# Patient Record
Sex: FEMALE | Race: BLACK OR AFRICAN AMERICAN | NOT HISPANIC OR LATINO | URBAN - METROPOLITAN AREA
[De-identification: names, ages, dates, MRNs, and addresses within clinical notes are randomized per-mention and may not be internally consistent; named-entity substitution may affect disease eponyms.]

---

## 2019-05-11 ENCOUNTER — INPATIENT (INPATIENT)
Facility: HOSPITAL | Age: 74
LOS: 6 days | Discharge: NURSING, REHAB OR LONG-TERM CARE | End: 2019-05-18
Attending: INTERNAL MEDICINE | Admitting: INTERNAL MEDICINE
Payer: MEDICARE

## 2019-05-11 VITALS
DIASTOLIC BLOOD PRESSURE: 92 MMHG | HEIGHT: 65 IN | SYSTOLIC BLOOD PRESSURE: 148 MMHG | WEIGHT: 95.02 LBS | HEART RATE: 68 BPM | RESPIRATION RATE: 18 BRPM | OXYGEN SATURATION: 96 % | TEMPERATURE: 96 F

## 2019-05-11 DIAGNOSIS — Z82.0 FAMILY HISTORY OF EPILEPSY AND OTHER DISEASES OF THE NERVOUS SYSTEM: ICD-10-CM

## 2019-05-11 DIAGNOSIS — I63.9 CEREBRAL INFARCTION, UNSPECIFIED: ICD-10-CM

## 2019-05-11 LAB
ALBUMIN SERPL ELPH-MCNC: 4 G/DL — SIGNIFICANT CHANGE UP (ref 3.3–5)
ALP SERPL-CCNC: 70 U/L — SIGNIFICANT CHANGE UP (ref 40–120)
ALT FLD-CCNC: 19 U/L — SIGNIFICANT CHANGE UP (ref 12–78)
ANION GAP SERPL CALC-SCNC: 10 MMOL/L — SIGNIFICANT CHANGE UP (ref 5–17)
APPEARANCE UR: CLEAR — SIGNIFICANT CHANGE UP
APTT BLD: 31.2 SEC — SIGNIFICANT CHANGE UP (ref 27.5–36.3)
AST SERPL-CCNC: 16 U/L — SIGNIFICANT CHANGE UP (ref 15–37)
BACTERIA # UR AUTO: ABNORMAL
BILIRUB SERPL-MCNC: 0.7 MG/DL — SIGNIFICANT CHANGE UP (ref 0.2–1.2)
BILIRUB UR-MCNC: ABNORMAL
BUN SERPL-MCNC: 16 MG/DL — SIGNIFICANT CHANGE UP (ref 7–23)
CALCIUM SERPL-MCNC: 9.5 MG/DL — SIGNIFICANT CHANGE UP (ref 8.5–10.1)
CHLORIDE SERPL-SCNC: 106 MMOL/L — SIGNIFICANT CHANGE UP (ref 96–108)
CK MB BLD-MCNC: 1.2 % — SIGNIFICANT CHANGE UP (ref 0–3.5)
CK MB CFR SERPL CALC: 1.2 NG/ML — SIGNIFICANT CHANGE UP (ref 0.5–3.6)
CK SERPL-CCNC: 98 U/L — SIGNIFICANT CHANGE UP (ref 26–192)
CO2 SERPL-SCNC: 28 MMOL/L — SIGNIFICANT CHANGE UP (ref 22–31)
COLOR SPEC: YELLOW — SIGNIFICANT CHANGE UP
CREAT SERPL-MCNC: 1.12 MG/DL — SIGNIFICANT CHANGE UP (ref 0.5–1.3)
DIFF PNL FLD: NEGATIVE — SIGNIFICANT CHANGE UP
EPI CELLS # UR: SIGNIFICANT CHANGE UP
GLUCOSE BLDC GLUCOMTR-MCNC: 128 MG/DL — HIGH (ref 70–99)
GLUCOSE SERPL-MCNC: 132 MG/DL — HIGH (ref 70–99)
GLUCOSE UR QL: NEGATIVE MG/DL — SIGNIFICANT CHANGE UP
HCT VFR BLD CALC: 42.2 % — SIGNIFICANT CHANGE UP (ref 34.5–45)
HGB BLD-MCNC: 13.5 G/DL — SIGNIFICANT CHANGE UP (ref 11.5–15.5)
INR BLD: 1.1 RATIO — SIGNIFICANT CHANGE UP (ref 0.88–1.16)
KETONES UR-MCNC: ABNORMAL
LEUKOCYTE ESTERASE UR-ACNC: ABNORMAL
LIDOCAIN IGE QN: 42 U/L — LOW (ref 73–393)
MAGNESIUM SERPL-MCNC: 2.2 MG/DL — SIGNIFICANT CHANGE UP (ref 1.6–2.6)
MCHC RBC-ENTMCNC: 31.5 PG — SIGNIFICANT CHANGE UP (ref 27–34)
MCHC RBC-ENTMCNC: 32 GM/DL — SIGNIFICANT CHANGE UP (ref 32–36)
MCV RBC AUTO: 98.4 FL — SIGNIFICANT CHANGE UP (ref 80–100)
NITRITE UR-MCNC: NEGATIVE — SIGNIFICANT CHANGE UP
NRBC # BLD: 0 /100 WBCS — SIGNIFICANT CHANGE UP (ref 0–0)
PH UR: 5 — SIGNIFICANT CHANGE UP (ref 5–8)
PLATELET # BLD AUTO: 212 K/UL — SIGNIFICANT CHANGE UP (ref 150–400)
POTASSIUM SERPL-MCNC: 3.8 MMOL/L — SIGNIFICANT CHANGE UP (ref 3.5–5.3)
POTASSIUM SERPL-SCNC: 3.8 MMOL/L — SIGNIFICANT CHANGE UP (ref 3.5–5.3)
PROT SERPL-MCNC: 7.8 GM/DL — SIGNIFICANT CHANGE UP (ref 6–8.3)
PROT UR-MCNC: 15 MG/DL
PROTHROM AB SERPL-ACNC: 12.4 SEC — SIGNIFICANT CHANGE UP (ref 10–12.9)
RBC # BLD: 4.29 M/UL — SIGNIFICANT CHANGE UP (ref 3.8–5.2)
RBC # FLD: 12.9 % — SIGNIFICANT CHANGE UP (ref 10.3–14.5)
SODIUM SERPL-SCNC: 144 MMOL/L — SIGNIFICANT CHANGE UP (ref 135–145)
SP GR SPEC: 1.02 — SIGNIFICANT CHANGE UP (ref 1.01–1.02)
TROPONIN I SERPL-MCNC: <.015 NG/ML — SIGNIFICANT CHANGE UP (ref 0.01–0.04)
TSH SERPL-MCNC: 0.89 UIU/ML — SIGNIFICANT CHANGE UP (ref 0.36–3.74)
UROBILINOGEN FLD QL: 4 MG/DL
WBC # BLD: 9.54 K/UL — SIGNIFICANT CHANGE UP (ref 3.8–10.5)
WBC # FLD AUTO: 9.54 K/UL — SIGNIFICANT CHANGE UP (ref 3.8–10.5)
WBC UR QL: SIGNIFICANT CHANGE UP

## 2019-05-11 PROCEDURE — 71045 X-RAY EXAM CHEST 1 VIEW: CPT | Mod: 26

## 2019-05-11 PROCEDURE — 70450 CT HEAD/BRAIN W/O DYE: CPT | Mod: 26

## 2019-05-11 PROCEDURE — 99285 EMERGENCY DEPT VISIT HI MDM: CPT

## 2019-05-11 PROCEDURE — 93010 ELECTROCARDIOGRAM REPORT: CPT

## 2019-05-11 RX ORDER — ENOXAPARIN SODIUM 100 MG/ML
30 INJECTION SUBCUTANEOUS DAILY
Refills: 0 | Status: DISCONTINUED | OUTPATIENT
Start: 2019-05-11 | End: 2019-05-18

## 2019-05-11 RX ORDER — ENOXAPARIN SODIUM 100 MG/ML
40 INJECTION SUBCUTANEOUS DAILY
Refills: 0 | Status: DISCONTINUED | OUTPATIENT
Start: 2019-05-11 | End: 2019-05-11

## 2019-05-11 RX ORDER — ASPIRIN/CALCIUM CARB/MAGNESIUM 324 MG
81 TABLET ORAL DAILY
Refills: 0 | Status: DISCONTINUED | OUTPATIENT
Start: 2019-05-11 | End: 2019-05-18

## 2019-05-11 RX ORDER — ATORVASTATIN CALCIUM 80 MG/1
10 TABLET, FILM COATED ORAL AT BEDTIME
Refills: 0 | Status: DISCONTINUED | OUTPATIENT
Start: 2019-05-11 | End: 2019-05-18

## 2019-05-11 RX ADMIN — ATORVASTATIN CALCIUM 10 MILLIGRAM(S): 80 TABLET, FILM COATED ORAL at 22:49

## 2019-05-11 NOTE — ED PROVIDER NOTE - PHYSICAL EXAMINATION
Gen: Alert, Well appearing. NAD    Head: NC, AT, PERRL, EOMI, normal lids/conjunctiva   ENT:  patent oropharynx without erythema/exudate, uvula midline  Neck: supple, no tenderness/meningismus  Pulm: Bilateral clear BS, normal resp effort, no wheeze/stridor/retractions  CV: RRR, no M/R/G, +dist pulses   Abd: soft, NT/ND, +BS, no guarding/rebound tenderness  Mskel: no edema/erythema/cyanosis   Skin: no rash   Neuro: AAOx1, no sensory/motor deficits, CN 2-12 intact

## 2019-05-11 NOTE — H&P ADULT - NSHPPHYSICALEXAM_GEN_ALL_CORE
PHYSICAL EXAM:    GENERAL: NAD, well-groomed, well-developed  HEAD:  Atraumatic, Normocephalic  EYES: EOMI, PERRLA, conjunctiva and sclera clear  ENMT: No tonsillar erythema, exudates, or enlargement; Moist mucous membranes, , No lesions  NECK: Supple, No JVD, Normal thyroid  NERVOUS SYSTEM:  Alert  confused ; Motor moves  mild  rt  LE  wekness  CHEST/LUNG: Clear  bilaterally; No rales, rhonchi, wheezing, or rubs  HEART: Regular rate and rhythm; No murmurs, rubs, or gallops  ABDOMEN: Soft, Nontender, Nondistended; no  masses Bowel sounds present  EXTREMITIES:  + Peripheral Pulses, No clubbing, cyanosis, or edema  LYMPH: No lymphadenopathy noted   RECTAL: deferred    SKIN: No rashes or lesions

## 2019-05-11 NOTE — H&P ADULT - HISTORY OF PRESENT ILLNESS
patient  reported  with  weakness  this  am  evalauted  in  ER  found  to  have  cva  admitted  for  further  w/u  and treatment

## 2019-05-11 NOTE — ED ADULT NURSE NOTE - OBJECTIVE STATEMENT
Patient c/o altered mental status. As per son, patient woke up this morning with left sided weakness, left side of face drooping and noticeable change in mental status. Patient has dementia.

## 2019-05-11 NOTE — ED ADULT NURSE NOTE - NSIMPLEMENTINTERV_GEN_ALL_ED
Implemented All Fall with Harm Risk Interventions:  [place] to call system. Call bell, personal items and telephone within reach. Instruct patient to call for assistance. Room bathroom lighting operational. Non-slip footwear when patient is off stretcher. Physically safe environment: no spills, clutter or unnecessary equipment. Stretcher in lowest position, wheels locked, appropriate side rails in place. Provide visual cue, wrist band, yellow gown, etc. Monitor gait and stability. Monitor for mental status changes and reorient to person, place, and time. Review medications for side effects contributing to fall risk. Reinforce activity limits and safety measures with patient and family. Provide visual clues: red socks.

## 2019-05-11 NOTE — ED ADULT TRIAGE NOTE - CHIEF COMPLAINT QUOTE
as per son mom has weakness and facial drooping on left side, weakness is generalized with change in normal mental status, last seen normal was 10pm last night.  as per ems

## 2019-05-11 NOTE — H&P ADULT - NSHPLABSRESULTS_GEN_ALL_CORE
LABS:                        13.5   9.54  )-----------( 212      ( 11 May 2019 14:46 )             42.2         144  |  106  |  16  ----------------------------<  132<H>  3.8   |  28  |  1.12    Ca    9.5      11 May 2019 14:46  Mg     2.2         TPro  7.8  /  Alb  4.0  /  TBili  0.7  /  DBili  x   /  AST  16  /  ALT  19  /  AlkPhos  70      PT/INR - ( 11 May 2019 14:46 )   PT: 12.4 sec;   INR: 1.10 ratio         PTT - ( 11 May 2019 14:46 )  PTT:31.2 sec  Urinalysis Basic - ( 11 May 2019 18:04 )    Color: Yellow / Appearance: Clear / S.025 / pH: x  Gluc: x / Ketone: Small  / Bili: Small / Urobili: 4 mg/dL   Blood: x / Protein: 15 mg/dL / Nitrite: Negative   Leuk Esterase: Trace / RBC: x / WBC 0-2   Sq Epi: x / Non Sq Epi: Occasional / Bacteria: Few      < from: CT Head No Cont (19 @ 16:14) >    EXAM:  CT BRAIN                            PROCEDURE DATE:  2019          INTERPRETATION:  Left facial droop, weakness.    Noncontrast head CT.  Moderate cerebral parenchymal volume loss with commensurate proportionate   ventricular sulcal prominence. Moderate chronic ischemic gliotic   bihemispheric cerebral white matter changes. No acute territorial   infarct, intra-axial or extra-axial hemorrhage edema or mass effect.   Mastoids and paranasal sinuses are clear. Cranium intact. Bilateral   hyperostosis frontalis.    Impression: No acute findings. Involutional, chronic microangiopathic   changes.    If neurologic findings persist or progress consider MR for more sensitive   evaluation, if there are no contraindications.      < end of copied text >

## 2019-05-11 NOTE — ED PROVIDER NOTE - OBJECTIVE STATEMENT
74yo female with pmh dementia (AOx2, ambulates mostly independently) presents with  left facial droop, complete aphasia and gen weakness noted upon waking up at 12pm but then improved upon arrival to ER at 1pm, now pt at baseline).     ROS: No fever/chills. No photophobia/eye pain/changes in vision, No ear pain/sore throat/dysphagia, No chest pain/palpitations. No SOB/cough/stridor. No abdominal pain, N/V/D, no black/bloody bm. No dysuria/frequency/discharge, No headache. No Dizziness.  No rash.  No numbness/tingling/weakness.

## 2019-05-12 LAB
ALBUMIN SERPL ELPH-MCNC: 3.6 G/DL — SIGNIFICANT CHANGE UP (ref 3.3–5)
ALP SERPL-CCNC: 65 U/L — SIGNIFICANT CHANGE UP (ref 40–120)
ALT FLD-CCNC: 19 U/L — SIGNIFICANT CHANGE UP (ref 12–78)
ANION GAP SERPL CALC-SCNC: 12 MMOL/L — SIGNIFICANT CHANGE UP (ref 5–17)
AST SERPL-CCNC: 27 U/L — SIGNIFICANT CHANGE UP (ref 15–37)
BILIRUB SERPL-MCNC: 0.8 MG/DL — SIGNIFICANT CHANGE UP (ref 0.2–1.2)
BUN SERPL-MCNC: 18 MG/DL — SIGNIFICANT CHANGE UP (ref 7–23)
CALCIUM SERPL-MCNC: 9.2 MG/DL — SIGNIFICANT CHANGE UP (ref 8.5–10.1)
CHLORIDE SERPL-SCNC: 103 MMOL/L — SIGNIFICANT CHANGE UP (ref 96–108)
CHOLEST SERPL-MCNC: 338 MG/DL — HIGH (ref 10–199)
CO2 SERPL-SCNC: 26 MMOL/L — SIGNIFICANT CHANGE UP (ref 22–31)
CREAT SERPL-MCNC: 0.93 MG/DL — SIGNIFICANT CHANGE UP (ref 0.5–1.3)
GLUCOSE SERPL-MCNC: 88 MG/DL — SIGNIFICANT CHANGE UP (ref 70–99)
HBA1C BLD-MCNC: 5 % — SIGNIFICANT CHANGE UP (ref 4–5.6)
HCT VFR BLD CALC: 40.5 % — SIGNIFICANT CHANGE UP (ref 34.5–45)
HDLC SERPL-MCNC: 50 MG/DL — SIGNIFICANT CHANGE UP
HGB BLD-MCNC: 13 G/DL — SIGNIFICANT CHANGE UP (ref 11.5–15.5)
LIPID PNL WITH DIRECT LDL SERPL: 274 MG/DL — HIGH
MCHC RBC-ENTMCNC: 31.4 PG — SIGNIFICANT CHANGE UP (ref 27–34)
MCHC RBC-ENTMCNC: 32.1 GM/DL — SIGNIFICANT CHANGE UP (ref 32–36)
MCV RBC AUTO: 97.8 FL — SIGNIFICANT CHANGE UP (ref 80–100)
NRBC # BLD: 0 /100 WBCS — SIGNIFICANT CHANGE UP (ref 0–0)
PLATELET # BLD AUTO: 205 K/UL — SIGNIFICANT CHANGE UP (ref 150–400)
POTASSIUM SERPL-MCNC: 3.9 MMOL/L — SIGNIFICANT CHANGE UP (ref 3.5–5.3)
POTASSIUM SERPL-SCNC: 3.9 MMOL/L — SIGNIFICANT CHANGE UP (ref 3.5–5.3)
PROT SERPL-MCNC: 7.3 GM/DL — SIGNIFICANT CHANGE UP (ref 6–8.3)
RBC # BLD: 4.14 M/UL — SIGNIFICANT CHANGE UP (ref 3.8–5.2)
RBC # FLD: 12.8 % — SIGNIFICANT CHANGE UP (ref 10.3–14.5)
SODIUM SERPL-SCNC: 141 MMOL/L — SIGNIFICANT CHANGE UP (ref 135–145)
T3 SERPL-MCNC: 59 NG/DL — LOW (ref 80–200)
T4 AB SER-ACNC: 7.3 UG/DL — SIGNIFICANT CHANGE UP (ref 4.6–12)
TOTAL CHOLESTEROL/HDL RATIO MEASUREMENT: 6.8 RATIO — SIGNIFICANT CHANGE UP (ref 3.3–7.1)
TRIGL SERPL-MCNC: 71 MG/DL — SIGNIFICANT CHANGE UP (ref 10–149)
WBC # BLD: 7.28 K/UL — SIGNIFICANT CHANGE UP (ref 3.8–10.5)
WBC # FLD AUTO: 7.28 K/UL — SIGNIFICANT CHANGE UP (ref 3.8–10.5)

## 2019-05-12 RX ADMIN — ATORVASTATIN CALCIUM 10 MILLIGRAM(S): 80 TABLET, FILM COATED ORAL at 21:52

## 2019-05-12 RX ADMIN — Medication 81 MILLIGRAM(S): at 12:08

## 2019-05-12 RX ADMIN — ENOXAPARIN SODIUM 30 MILLIGRAM(S): 100 INJECTION SUBCUTANEOUS at 12:08

## 2019-05-12 NOTE — CONSULT NOTE ADULT - SUBJECTIVE AND OBJECTIVE BOX
Subjective Complaints:  Historian:       Consult requested by ER doctor:                  Attending: DR Kirkpatrick     HPI:  Patient is a 73 y o woman admitted for left sided weakness involving the face ,arm and leg ,condition has improved ,patient has an h/o dementia and difficulty to follow commands .    LETICIA MORAN    PAST MEDICAL & SURGICAL HISTORY:  FH: senile dementia  73yFemale    MEDICATIONS  (STANDING):  aspirin enteric coated 81 milliGRAM(s) Oral daily  atorvastatin 10 milliGRAM(s) Oral at bedtime  enoxaparin Injectable 30 milliGRAM(s) SubCutaneous daily    MEDICATIONS  (PRN):      Allergies    No Known Allergies    Intolerances      FAMILY HISTORY:      REVIEW OF SYSTEMS:  General:  No wt loss, fevers, chills, night sweats  Eyes:  Good vision, no reported pain  ENT:  No sore throat, pain, runny nose, dysphagia  CV:  No pain, palpitatioins, hypo/hypertension  Resp:  No dyspnea, cough, tachypnea, wheezing  GI:  No pain, nausea, vomiting, diarrhea, constipatiion  :  No pain, bleeding, incontinence, nocturia  Muscle:  No pain, weakness  Breast:  No pain, abscess, mass, discharge  Neuro: lethargic ,unable to cooperate with exam   Psych:  No fatigue, insomnia, mood problems, depression  Endocrine:  No polyuria, polydypsia, cold/heat intolerance  Heme:  No petechiae, ecchymosis, easy bruisability  Skin:  No rash, tattoos, scars, edema      Vital Signs Last 24 Hrs  T(C): 36.9 (12 May 2019 11:04), Max: 36.9 (11 May 2019 17:20)  T(F): 98.4 (12 May 2019 11:04), Max: 98.5 (11 May 2019 17:20)  HR: 49 (12 May 2019 12:49) (49 - 76)  BP: 110/57 (12 May 2019 12:49) (110/57 - 177/75)  BP(mean): --  RR: 18 (12 May 2019 12:49) (16 - 18)  SpO2: 96% (12 May 2019 12:49) (90% - 100%)    GENERAL PHYSICAL EXAM:  General:  Appears stated age, well-groomed, well-nourished, no distress  HEENT:  NC/AT, patent nares w/ pink mucosa, OP clear w/o lesions, PERRL, EOMI, conjunctivae clear, no thyromegaly, nodules, adenopathy, no JVD  Chest:  Full & symmetric excursion, no increased effort, breath sounds clear  Cardiovascular:  Regular rhythm, S1, S2, no murmur/rub/S3/S4, no carotid/femoral/abdominal bruit, radial/pedal pulses 2+, no edema  Abdomen:  Soft, non-tender, non-distended, normoactive bowel sounds, no HSM  Extremities:  Gait & station:   Digits:   Nails:   Joints, Bones, Muscles:   ROM:   Stability:  Skin:  No rash/erythema/ecchymoses/petechiae/wounds/abscess/warm/dry  Musculoskeletal:  Full ROM in all joints w/o swelling/tenderness/effusion    NEUROLOGICAL EXAM:  HENT:  Normocephalic head; atraumatic head.  Neck supple.  ENT: normal looking.  Mental State:   sleepy but arousable ,unable to follow commands   Cranial Nerves:  II-XII:   Pupils round and reactive to light and accommodation.  Extraocular movements full.  Visual fields full (no homonymous hemianopsia).  Visual acuity wnl.  Facial symmetry intact.  Tongue midline.  Motor Functions: 3/5 throughout   Sensory Functions:   Intact to touch and pinprick to face and extremities.    Reflexes:  Deep tendon reflexes normoactive to biceps, knees and ankles.  Babinski absent (present).  Cerebellar Testing:    Finger to nose intact.  Nystagmus absent.  Gait: unable to stand      LABS:                        13.0   7.28  )-----------( 205      ( 12 May 2019 07:54 )             40.5     05-12    141  |  103  |  18  ----------------------------<  88  3.9   |  26  |  0.93    Ca    9.2      12 May 2019 07:54  Mg     2.2     05-    TPro  7.3  /  Alb  3.6  /  TBili  0.8  /  DBili  x   /  AST  27  /  ALT  19  /  AlkPhos  65  05-12    PT/INR - ( 11 May 2019 14:46 )   PT: 12.4 sec;   INR: 1.10 ratio         PTT - ( 11 May 2019 14:46 )  PTT:31.2 sec    Urinalysis Basic - ( 11 May 2019 18:04 )    Color: Yellow / Appearance: Clear / S.025 / pH: x  Gluc: x / Ketone: Small  / Bili: Small / Urobili: 4 mg/dL   Blood: x / Protein: 15 mg/dL / Nitrite: Negative   Leuk Esterase: Trace / RBC: x / WBC 0-2   Sq Epi: x / Non Sq Epi: Occasional / Bacteria: Few        RADIOLOGY & ADDITIONAL STUDIES:    12 Lead ECG:   Provider's Contact #: [phone] ( @ 13:56)  Comprehensive Metabolic Panel: STAT ( @ 14:03)  Complete Blood Count: STAT ( @ 14:03)  Troponin I, Serum: STAT ( @ 14:03)  Lipase, Serum: STAT ( 14:03)  Magnesium, Serum: STAT ( 14:03)  Prothrombin Time and INR, Plasma:  Start Date:11-May-2019. STAT ( @ 14:03)  Activated Partial Thromboplastin Time:  Start Date:11-May-2019. STAT ( @ 14:03)  Urinalysis: STAT ( @ 14:03)  12 Lead ECG ( @ 14:03)  Xray Chest 1 View AP/PA.: Urgent   Indication: Chest Pain  Transport: Walking  Exam Completed ( @ 14:03)  Cardiac Monitor Bedside:     Time/Priority:  STAT ( @ 14:03)  IV Insert:     Time/Priority:  STAT ( @ 14:03)  Creatine Kinase, Serum: STAT ( @ 14:03)  CKMB Mass Assay: STAT ( @ 14:03)  CT Head No Cont: Urgent   Indication: left facial droop, weakness now resolved  Transport: Stretcher-Crib  Exam Completed  Provider's Contact #: [phone] ( @ 14:03)  POCT  Blood Glucose: 14:04 ( @ 14:05)  Admit to Inpatient Level of Care:     Service:  TELEMETRY    Physician:  Yossi Kirkpatrick    Additional Instructions:  Diagnosis: TIA (transient ischemic attack)  Isolation: None  Special Consideration: None ( @ 16:47)  Admit from ED ( @ 17:00)  Urine Microscopic-Add On (NC): 17:50 ( @ 18:08)  aspirin enteric coated: [Ordered as Ecotrin]  81 milliGRAM(s), Oral, daily  Administration Instructions: Swallow whole * don't crush/chew  Provider's Contact #: [phone] ( 19:32)  atorvastatin: [Ordered as LIPITOR]  10 milliGRAM(s), Oral, at bedtime  Provider's Contact #: [phone] ( 19:32)  enoxaparin Injectable: [Ordered as LOVENOX]  40 milliGRAM(s), SubCutaneous, daily  Provider's Contact #: [phone] ( 19:32)  TTE Echo Doppler w/o Cont:   Transport: Stretcher-Crib  Monitor: w/o Monitor  Provider's Contact #: [phone] (:32)  Triiodothyronine, Total (T3 Total): Routine (:32)  T4, Serum: Routine (:32)  Thyroid Stimulating Hormone, Serum: Routine  Cancel Reason: -Lab Reordered (:32)  Diet, Regular (:32)  US Duplex Carotid Arteries Complete, Bilateral: Routine   Indication: change  in mental  status  Transport: Stretcher-Crib  Provider's Contact #: [phone] (:32)  Comprehensive Metabolic Panel: AM Sched. Collection: 12-May-2019 05:00 ( @ 19:32)  Complete Blood Count: AM Sched. Collection: 12-May-2019 05:00 ( @ 19:32)  Culture - Urine: Routine  Specimen Source: Clean Catch (Midstream)  Addl Info: If indwelling urinary catheter > 14 days, obtain an order to remove and replace prior to c ( @ 19:32)  Consult- PT Evaluate and Treat:   *Reason for Consult - Must select at least one choice*     Other: cva  Weight Bearing Restrictions: No ( @ 19:38)  enoxaparin Injectable: [Ordered as LOVENOX]  30 milliGRAM(s), SubCutaneous, daily  Provider's Contact #: [phone] ( @ 19:43)  Provider to RN:       pt  evaluated  ready  for  admission ( @ 21:03)  Thyroid Stimulating Hormone, Serum: 14:40 ( @ 21:15)  Consult- Chaplaincy Care:    Reason for Consult: Hospital  Requested ( @ 22:34)  Remote Telemetry/EKG EXCEPT Off Unit Tests:     Time/Priority:  Routine ( @ 22:46)  Lipid Profile: AM Sched. Collection: 12-May-2019 05:00 ( @ 00:22)  Hemoglobin A1C, Whole Blood: AM Sched. Collection: 12-May-2019 05:00 ( @ 00:22)      Assessment & Opinion:73 y o woman with h/o dementia seen for evaluation because of possible cva   DX TIA ? CVA?     Recommendations:  Brain MRI.  Carotid doppler.  Echocardiogram.    DVT prophylaxis as ordered.  Medications:  ASA ,STATIN

## 2019-05-12 NOTE — PROGRESS NOTE ADULT - SUBJECTIVE AND OBJECTIVE BOX
INTERVAL HPI/OVERNIGHT EVENTS:        REVIEW OF SYSTEMS:  CONSTITUTIONAL:  no  complaints      MEDICATION:  aspirin enteric coated 81 milliGRAM(s) Oral daily  atorvastatin 10 milliGRAM(s) Oral at bedtime  enoxaparin Injectable 30 milliGRAM(s) SubCutaneous daily    Vital Signs Last 24 Hrs  T(C): 36.6 (12 May 2019 05:13), Max: 36.9 (11 May 2019 17:20)  T(F): 97.8 (12 May 2019 05:13), Max: 98.5 (11 May 2019 17:20)  HR: 59 (12 May 2019 05:13) (59 - 76)  BP: 158/72 (12 May 2019 05:13) (148/92 - 177/75)  BP(mean): --  RR: 17 (12 May 2019 05:13) (16 - 18)  SpO2: 91% (12 May 2019 05:13) (90% - 100%)    PHYSICAL EXAM:  GENERAL: NAD, well-groomed, well-developed  EYES:  conjunctiva and sclera clear  ENMT:  Moist mucous membranes,   NECK: Supple, No JVD, Normal thyroid  NERVOUS SYSTEM:  Alert oriented x1  moves all  extr   CHEST/LUNG: Clear    HEART: Regular rate and rhythm; No murmurs, rubs, or gallops  ABDOMEN: Soft, Nontender, Nondistended; Bowel sounds present  EXTREMITIES:  no  edema no  tenderness  SKIN: No rashes   LABS:                        13.5   9.54  )-----------( 212      ( 11 May 2019 14:46 )             42.2     05-11    144  |  106  |  16  ----------------------------<  132<H>  3.8   |  28  |  1.12    Ca    9.5      11 May 2019 14:46  Mg     2.2     05-11    TPro  7.8  /  Alb  4.0  /  TBili  0.7  /  DBili  x   /  AST  16  /  ALT  19  /  AlkPhos  70  05-11    PT/INR - ( 11 May 2019 14:46 )   PT: 12.4 sec;   INR: 1.10 ratio         PTT - ( 11 May 2019 14:46 )  PTT:31.2 sec  Urinalysis Basic - ( 11 May 2019 18:04 )    Color: Yellow / Appearance: Clear / S.025 / pH: x  Gluc: x / Ketone: Small  / Bili: Small / Urobili: 4 mg/dL   Blood: x / Protein: 15 mg/dL / Nitrite: Negative   Leuk Esterase: Trace / RBC: x / WBC 0-2   Sq Epi: x / Non Sq Epi: Occasional / Bacteria: Few      CAPILLARY BLOOD GLUCOSE      POCT Blood Glucose.: 128 mg/dL (11 May 2019 14:04)      RADIOLOGY & ADDITIONAL TESTS:    Imaging reports  Personally Reviewed:  [x ] YES  [ ] NO    Consultant(s) Notes Reviewed:  [ ] YES  [ ] NO    Care Discussed with Consultants/Other Providers [ x] YES  [ ] NO  Problem/Plan - 1:  ·  Problem: CVA (cerebral vascular accident).  Plan: asa  statin  tte  cartid  doppler  neuro  eval patient  eval.     Problem/Plan - 2:  ·  Problem: FH: senile dementia.  Plan: observation

## 2019-05-13 PROCEDURE — 70551 MRI BRAIN STEM W/O DYE: CPT | Mod: 26

## 2019-05-13 PROCEDURE — 93880 EXTRACRANIAL BILAT STUDY: CPT | Mod: 26

## 2019-05-13 RX ORDER — HYDRALAZINE HCL 50 MG
5 TABLET ORAL ONCE
Refills: 0 | Status: COMPLETED | OUTPATIENT
Start: 2019-05-13 | End: 2019-05-13

## 2019-05-13 RX ADMIN — ENOXAPARIN SODIUM 30 MILLIGRAM(S): 100 INJECTION SUBCUTANEOUS at 11:18

## 2019-05-13 RX ADMIN — Medication 5 MILLIGRAM(S): at 00:43

## 2019-05-13 RX ADMIN — ATORVASTATIN CALCIUM 10 MILLIGRAM(S): 80 TABLET, FILM COATED ORAL at 21:56

## 2019-05-13 RX ADMIN — Medication 81 MILLIGRAM(S): at 11:18

## 2019-05-13 NOTE — DIETITIAN INITIAL EVALUATION ADULT. - ENERGY NEEDS
Height (cm): 165.1 (05-11)  Weight (kg): 38 (05-11)  BMI (kg/m2): 13.9 (05-11)  IBW:  56.8 kg +/- 10%  % IBW:  67%    UBW: unknown     %UBW: unknown

## 2019-05-13 NOTE — DIETITIAN INITIAL EVALUATION ADULT. - PERTINENT LABORATORY DATA
05-12 Na141 mmol/L Glu 88 mg/dL K+ 3.9 mmol/L Cr  0.93 mg/dL BUN 18 mg/dL 05-12 Alb 3.6 g/dL 05-12 [id number] 5.0 % 05-12 Chol 338 mg/dL<H>  mg/dL<H> HDL 50 mg/dL Trig 71 mg/dL

## 2019-05-13 NOTE — PHYSICAL THERAPY INITIAL EVALUATION ADULT - STRENGTHENING, PT EVAL
Pt will increase general upper and lower extremity strength to 5/5 to improve functional strength  by 4 weeks
Pt will increase muscle strength to 4+/5 to improve transfers and ambulation within 3-4 weeks.

## 2019-05-13 NOTE — PHYSICAL THERAPY INITIAL EVALUATION ADULT - MANUAL MUSCLE TESTING RESULTS, REHAB EVAL
BUE/BLE grossly graded 3-/5./grossly assessed due to
MMT 3+/5 for bilateral upper and lower extremities

## 2019-05-13 NOTE — DIETITIAN INITIAL EVALUATION ADULT. - PHYSICAL APPEARANCE
underweight/emaciated/debilitated/contracted/BMI = 14; no edema noted; Nutrition focused physical exam conducted:  Subcutaneous fat loss: [moderate ] Orbital fat pads region, [mild ]Buccal fat region, [severe ]Triceps region,  [severe ]Ribs region.  Muscle wasting: [severe ]Temples region, [severe ]Clavicle region, [severe ]Shoulder region, [severe ]Scapula region, [severe ]Interosseous region,  [severe ]thigh region, [severe ]Calf region

## 2019-05-13 NOTE — PHYSICAL THERAPY INITIAL EVALUATION ADULT - BALANCE DISTURBANCE, IDENTIFIED IMPAIRMENT CONTRIBUTE, REHAB EVAL
impaired motor control/impaired postural control/decreased strength
impaired postural control/decreased strength

## 2019-05-13 NOTE — DIETITIAN INITIAL EVALUATION ADULT. - OTHER INFO
Pt seen for low BMI. Unable to interview pt due to cognitive impairment. No reports of N/V/C/D or chew/swallowing issues but will change to soft consistency for ease of eating.

## 2019-05-13 NOTE — PHYSICAL THERAPY INITIAL EVALUATION ADULT - PERTINENT HX OF CURRENT PROBLEM, REHAB EVAL
TIA
Patient is a 72 y/o female admitted to [facility] due to TIA. CT of head negative acute findings.

## 2019-05-13 NOTE — PHYSICAL THERAPY INITIAL EVALUATION ADULT - IMPAIRMENTS CONTRIBUTING TO GAIT DEVIATIONS, PT EVAL
impaired balance/decreased strength
impaired balance/cognition/impaired postural control/decreased strength/decreased flexibility

## 2019-05-13 NOTE — PHYSICAL THERAPY INITIAL EVALUATION ADULT - BALANCE TRAINING, PT EVAL
Pt will increase static/dynamic standing balance to WFL to perform all functional mobility without LOB, by 2 weeks
Pt will improve standing balance(S/D) to G/G- to increase dynamic activities within 3-4 weeks.

## 2019-05-13 NOTE — PHYSICAL THERAPY INITIAL EVALUATION ADULT - ACTIVE RANGE OF MOTION EXAMINATION, REHAB EVAL
AAROM on BUE/BLE./bilateral lower extremity Active ROM was WNL (within normal limits)/deficits as listed below/ravin. upper extremity Active ROM was WNL (within normal limits)
no Active ROM deficits were identified

## 2019-05-13 NOTE — DISCHARGE NOTE NURSING/CASE MANAGEMENT/SOCIAL WORK - NSDCDPATPORTLINK_GEN_ALL_CORE
You can access the [facility] Patient Portal, offered by [facility], by registering with the following website: [email or web address]

## 2019-05-13 NOTE — PROGRESS NOTE ADULT - SUBJECTIVE AND OBJECTIVE BOX
INTERVAL HPI/OVERNIGHT EVENTS:        REVIEW OF SYSTEMS:  CONSTITUTIONAL:  no  complaints    NECK: No pain or stiffnes  RESPIRATORY: No SOB   CARDIOVASCULAR: No chest pain, palpitations, dizziness,   GASTROINTESTINAL: No abdominal pain. No nausea, vomiting,   NEUROLOGICAL: No headaches, no  blurry  vision no  dizziness  SKIN: No itching,   MUSCULOSKELETAL: No pain    MEDICATION:  aspirin enteric coated 81 milliGRAM(s) Oral daily  atorvastatin 10 milliGRAM(s) Oral at bedtime  enoxaparin Injectable 30 milliGRAM(s) SubCutaneous daily    Vital Signs Last 24 Hrs  T(C): 36 (13 May 2019 05:09), Max: 36.9 (12 May 2019 11:04)  T(F): 96.8 (13 May 2019 05:09), Max: 98.4 (12 May 2019 11:04)  HR: 73 (13 May 2019 05:09) (49 - 75)  BP: 148/79 (13 May 2019 05:09) (110/57 - 181/99)  BP(mean): --  RR: 17 (13 May 2019 05:09) (17 - 18)  SpO2: 90% (13 May 2019 05:09) (90% - 96%)    PHYSICAL EXAM:  GENERAL: NAD, well-groomed, well-developed  EYES:  conjunctiva and sclera clear  ENMT:  Moist mucous membranes,   NECK: Supple, No JVD, Normal thyroid  NERVOUS SYSTEM:  Alert oriented  x1   moves  all  extr  CHEST/LUNG: Clear    HEART: Regular rate and rhythm; No murmurs, rubs, or gallops  ABDOMEN: Soft, Nontender, Nondistended; Bowel sounds present  EXTREMITIES:  no  edema no  tenderness  SKIN: No rashes   LABS:                        13.0   7.28  )-----------( 205      ( 12 May 2019 07:54 )             40.5     05-12    141  |  103  |  18  ----------------------------<  88  3.9   |  26  |  0.93    Ca    9.2      12 May 2019 07:54  Mg     2.2         TPro  7.3  /  Alb  3.6  /  TBili  0.8  /  DBili  x   /  AST  27  /  ALT  19  /  AlkPhos  65  05-12    PT/INR - ( 11 May 2019 14:46 )   PT: 12.4 sec;   INR: 1.10 ratio         PTT - ( 11 May 2019 14:46 )  PTT:31.2 sec  Urinalysis Basic - ( 11 May 2019 18:04 )    Color: Yellow / Appearance: Clear / S.025 / pH: x  Gluc: x / Ketone: Small  / Bili: Small / Urobili: 4 mg/dL   Blood: x / Protein: 15 mg/dL / Nitrite: Negative   Leuk Esterase: Trace / RBC: x / WBC 0-2   Sq Epi: x / Non Sq Epi: Occasional / Bacteria: Few      CAPILLARY BLOOD GLUCOSE          RADIOLOGY & ADDITIONAL TESTS:    Imaging reports  Personally Reviewed:  [ x] YES  [ ] NO    Consultant(s) Notes Reviewed:  [x] YES  [ ] NO    Care Discussed with Consultants/Other Providers [ x] YES  [ ] NO  Problem/Plan - 1:  ·  Problem: CVA (cerebral vascular accident).  Plan: asa  statin  tte  carotid  doppler  neuro  eval patient  eval. appreciated    Problem/Plan - 2:  ·  Problem: FH: senile dementia.  Plan: observation

## 2019-05-13 NOTE — PHYSICAL THERAPY INITIAL EVALUATION ADULT - PLANNED THERAPY INTERVENTIONS, PT EVAL
Pt will independently negotiate [XX] steps with R railing up/L railing down to enter and exit home, by 1week/balance training/gait training/strengthening
bed mobility training/gait training/strengthening/balance training/transfer training

## 2019-05-13 NOTE — PHYSICAL THERAPY INITIAL EVALUATION ADULT - RANGE OF MOTION EXAMINATION, REHAB EVAL
deficits as listed below/bilateral lower extremity was ROM was WNL (within normal limits)/bilateral upper extremity ROM was WNL (within normal limits)/AAROM on BUE/BLE.
no ROM deficits were identified

## 2019-05-13 NOTE — PHYSICAL THERAPY INITIAL EVALUATION ADULT - GAIT DEVIATIONS NOTED, PT EVAL
increased time in double stance/decreased lina/decreased velocity of limb motion/decreased step length/decreased stride length
decreased lina/increased stride width/increased time in double stance/decreased step length/decreased stride length

## 2019-05-13 NOTE — PHYSICAL THERAPY INITIAL EVALUATION ADULT - TRANSFER SAFETY CONCERNS NOTED: SIT/STAND, REHAB EVAL
decreased step length/decreased safety awareness/decreased sequencing ability/decreased weight-shifting ability/decreased balance during turns

## 2019-05-13 NOTE — DISCHARGE NOTE NURSING/CASE MANAGEMENT/SOCIAL WORK - NSDCPEPTSTRK_GEN_ALL_CORE
Risk factors for stroke/Prescribed medications/Stroke education booklet/Stroke support groups for patients, families, and friends/Call 911 for stroke/Need for follow up after discharge/Stroke warning signs and symptoms/Signs and symptoms of stroke

## 2019-05-13 NOTE — CHART NOTE - NSCHARTNOTEFT_GEN_A_CORE
Upon Nutritional Assessment by the Registered Dietitian your patient was determined to meet criteria / has evidence of the following diagnosis/diagnoses:          [ ]  Mild Protein Calorie Malnutrition        [ ]  Moderate Protein Calorie Malnutrition        [X ] Severe Protein Calorie Malnutrition        [ ] Unspecified Protein Calorie Malnutrition        [X ] Underweight / BMI <19        [ ] Morbid Obesity / BMI > 40      Findings as based on:  •  Comprehensive nutrition assessment and consultation  •  Calorie counts (nutrient intake analysis)  •  Food acceptance and intake status from observations by staff  •  Follow up  •  Patient education  •  Intervention secondary to interdisciplinary rounds  •   concerns      Treatment:    The following diet has been recommended:  Soft diet for ease of eating + Ensure Enlive x 2/day (provides 700 kcal, 40 g protein) for additional nutrition support      PROVIDER Section:     By signing this assessment you are acknowledging and agree with the diagnosis/diagnoses assigned by the Registered Dietitian    Comments:

## 2019-05-13 NOTE — CONSULT NOTE ADULT - SUBJECTIVE AND OBJECTIVE BOX
Patient is a 73y old  Female who presents with a chief complaint of cva (13 May 2019 08:50)      HPI: patient  reported  with  weakness  this  am  evalauted  in  ER  found  to  have  cva  admitted  for  further  w/u  and treatment (11 May 2019 19:25)    Currently      REVIEW OF SYSTEMS as above  Constitutional - No fever, No weight loss, No fatigue  HEENT - No neck pain  Respiratory - No cough, No shortness of breath  Cardiovascular - No chest pain, No palpitations  Gastrointestinal - No abdominal pain, No nausea, No vomiting  Genitourinary - No dysuria, No frequency  Neurological - No headaches, No loss of strength, No numbness, No tremors  Skin - No lesions   Endocrine - No temperature intolerance  Musculoskeletal - No joint pain  Psychiatric - No depression, No anxiety    PAST MEDICAL & SURGICAL HISTORY  FH: senile dementia      SOCHX:  lives with her son and daughter-in-law in a pvt house - 5 steps to enter. Sherequired assist for all ADL's. Ambulation ind  No tobacco, no alcohol    FMHX: FA/MO  Noncontributory       ALLERGIES  No Known Allergies      MEDICATIONS reviewed  MEDICATIONS  (STANDING):  aspirin enteric coated 81 milliGRAM(s) Oral daily  atorvastatin 10 milliGRAM(s) Oral at bedtime  enoxaparin Injectable 30 milliGRAM(s) SubCutaneous daily    MEDICATIONS  (PRN):      VITALS  T(C): 36.8 (19 @ 11:21), Max: 36.8 (19 @ 00:07)  HR: 95 (19 @ 11:21) (71 - 95)  BP: 150/76 (19 @ 11:21) (131/71 - 181/99)  RR: 18 (19 @ 11:21) (17 - 18)  SpO2: 90% (19 @ 10:00) (90% - 91%)  Wt(kg): -- 38 kg    PHYSICAL EXAM  BMI - 13.9  Constitutional - NAD, Comfortable  HEENT - NCAT, EOMI  Neck - Supple, functional ROM  Cardiovascular - RRR  Abdomen - Soft, Not tender  Extremities - Functional range of motion   Neurologic -                    Cognitive - Awake, Alert, Oriented     Speech Intact     Language  Intact     Motor - No focal deficits     Sensory - Intact to LT     Reflexes - DTR Intact     Psychiatric - Mood       RECENT LABS reviewed  CBC Full  -  ( 12 May 2019 07:54 )  WBC Count : 7.28 K/uL  RBC Count : 4.14 M/uL  Hemoglobin : 13.0 g/dL  Hematocrit : 40.5 %  Platelet Count - Automated : 205 K/uL  Mean Cell Volume : 97.8 fl  Mean Cell Hemoglobin : 31.4 pg  Mean Cell Hemoglobin Concentration : 32.1 gm/dL          141  |  103  |  18  ----------------------------<  88  3.9   |  26  |  0.93    Ca    9.2      12 May 2019 07:54  Mg     2.2         TPro  7.3  /  Alb  3.6  /  TBili  0.8  /  DBili  x   /  AST  27  /  ALT  19  /  AlkPhos  65      A1c - 5.0    Urinalysis Basic - ( 11 May 2019 18:04 )    Color: Yellow / Appearance: Clear / S.025 / pH: x  Gluc: x / Ketone: Small  / Bili: Small / Urobili: 4 mg/dL   Blood: x / Protein: 15 mg/dL / Nitrite: Negative   Leuk Esterase: Trace / RBC: x / WBC 0-2   Sq Epi: x / Non Sq Epi: Occasional / Bacteria: Few        IMAGING reviewed:  CT head - atrophic changes, no acute infarct or bleed  CXR - clear      Dr. Hoang - ?TIA - ASA, Statin    FUNCTIONAL HISTORY    CURRENT FUNCTIONAL STATUS    IMPRESSION:     PLAN: Will follow - recommendations will depend upon clinical and functional course. Patient is a 73y old  Female who presents with a chief complaint of cva (13 May 2019 08:50)    HPI: patient  reported  with  weakness  this  am  evalauted  in  ER  found  to  have  cva  admitted  for  further  w/u  and treatment (11 May 2019 19:25)    Currently in bed - confused, no complaints      REVIEW OF SYSTEMS as above - unable to answer. Discussed with son by phone - dementia, will not be able to answer    PAST MEDICAL & SURGICAL HISTORY  FH: senile dementia    SOCHX:  lives with her son and daughter-in-law in a pvt house - 5 steps to enter. She required assist for all ADL's. Ambulation ind  No tobacco, no alcohol    FMHX: FA/MO  Noncontributory     ALLERGIES  No Known Allergies    MEDICATIONS reviewed  MEDICATIONS  (STANDING):  aspirin enteric coated 81 milliGRAM(s) Oral daily  atorvastatin 10 milliGRAM(s) Oral at bedtime  enoxaparin Injectable 30 milliGRAM(s) SubCutaneous daily    MEDICATIONS  (PRN):      VITALS  T(C): 36.8 (19 @ 11:21), Max: 36.8 (19 @ 00:07)  HR: 95 (19 @ 11:21) (71 - 95)  BP: 150/76 (19 @ 11:21) (131/71 - 181/99)  RR: 18 (19 @ 11:21) (17 - 18)  SpO2: 90% (19 @ 10:00) (90% - 91%)  Wt(kg): -- 38 kg    PHYSICAL EXAM   BMI - 13.9  Constitutional - NAD, Comfortable in bed, O2 NC  HEENT - NCAT, EOMI  Neck - Supple, functional ROM  Cardiovascular - RRR  Abdomen - Soft, Not tender  Extremities - Functional range of motion? Limited exam due to inability to cooperate.  Neurologic -                    Cognitive - Awake, Alert, NAD     Speech grossly intact     Language  grossly intact     Motor - No focal deficits appreciated     Sensory - unreliable     Reflexes - DTR absent LEs     Psychiatric - Mood flat      RECENT LABS reviewed  CBC Full  -  ( 12 May 2019 07:54 )  WBC Count : 7.28 K/uL  RBC Count : 4.14 M/uL  Hemoglobin : 13.0 g/dL  Hematocrit : 40.5 %  Platelet Count - Automated : 205 K/uL  Mean Cell Volume : 97.8 fl  Mean Cell Hemoglobin : 31.4 pg  Mean Cell Hemoglobin Concentration : 32.1 gm/dL          141  |  103  |  18  ----------------------------<  88  3.9   |  26  |  0.93    Ca    9.2      12 May 2019 07:54  Mg     2.2         TPro  7.3  /  Alb  3.6  /  TBili  0.8  /  DBili  x   /  AST  27  /  ALT  19  /  AlkPhos  65      A1c - 5.0    Urinalysis Basic - ( 11 May 2019 18:04 )    Color: Yellow / Appearance: Clear / S.025 / pH: x  Gluc: x / Ketone: Small  / Bili: Small / Urobili: 4 mg/dL   Blood: x / Protein: 15 mg/dL / Nitrite: Negative   Leuk Esterase: Trace / RBC: x / WBC 0-2   Sq Epi: x / Non Sq Epi: Occasional / Bacteria: Few        IMAGING reviewed:  CT head - atrophic changes, no acute infarct or bleed  CXR - clear      Dr. Hoang - ?TIA - ASA, Statin    FUNCTIONAL HISTORY Ind amb all over the house without device.  Needed assistance with ADLs    CURRENT FUNCTIONAL STATUS TBD    IMPRESSION: 73 F with dementia but was able to amb ind. until acute onset weakness, unable to ambulate. W/U negative so far but she was acute decline in function.    PLAN: Will follow - recommendations will depend upon clinical and functional course.

## 2019-05-13 NOTE — PHYSICAL THERAPY INITIAL EVALUATION ADULT - GENERAL OBSERVATIONS, REHAB EVAL
Pt seen supine in bed, alert and Ox4, L side fascial droop, cardiac monitor intact.
Chart (EMR) reviewed. Received supine c HOB elevated, NAD. +O2 via nasal cannula. Alert. Ox1. Able to follow simple commands/directions.

## 2019-05-13 NOTE — PHYSICAL THERAPY INITIAL EVALUATION ADULT - ADDITIONAL COMMENTS
Patient lives c son and daughter in law in a pvt house c 5 entry steps c B/L rails(reachable), all amenities in the 1st floor. No HHA. Required assistance c all ADL's and ambulation without device.
PT has 3 stps to enter c BL railings (too far apart to hold on as same time). Pt has HHA 5 days a week for 4 hours tro assist with ADL's/IADL's (cooking cleaning).

## 2019-05-13 NOTE — PHYSICAL THERAPY INITIAL EVALUATION ADULT - GAIT TRAINING, PT EVAL
Pt will independently ambulate 200feet with rolling walker without loss of balance, by 2-3days.
Pt will improve ambulation to ~ 200 feet Supervision using rolling walker within 3-4 weeks.

## 2019-05-13 NOTE — PHYSICAL THERAPY INITIAL EVALUATION ADULT - IMPAIRMENTS FOUND, PT EVAL
muscle strength/gait, locomotion, and balance
joint integrity and mobility/poor safety awareness/gait, locomotion, and balance/posture/cognitive impairment/aerobic capacity/endurance/muscle strength

## 2019-05-13 NOTE — PHYSICAL THERAPY INITIAL EVALUATION ADULT - CRITERIA FOR SKILLED THERAPEUTIC INTERVENTIONS
home with home PT/predicted duration of therapy intervention/functional limitations in following categories/risk reduction/prevention/impairments found/therapy frequency/anticipated discharge recommendation
impairments found/therapy frequency/functional limitations in following categories/risk reduction/prevention/rehab potential/predicted duration of therapy intervention

## 2019-05-14 LAB
ALBUMIN SERPL ELPH-MCNC: 3.2 G/DL — LOW (ref 3.3–5)
ALP SERPL-CCNC: 72 U/L — SIGNIFICANT CHANGE UP (ref 40–120)
ALT FLD-CCNC: 24 U/L — SIGNIFICANT CHANGE UP (ref 12–78)
ANION GAP SERPL CALC-SCNC: 6 MMOL/L — SIGNIFICANT CHANGE UP (ref 5–17)
AST SERPL-CCNC: 27 U/L — SIGNIFICANT CHANGE UP (ref 15–37)
BILIRUB SERPL-MCNC: 1 MG/DL — SIGNIFICANT CHANGE UP (ref 0.2–1.2)
BUN SERPL-MCNC: 12 MG/DL — SIGNIFICANT CHANGE UP (ref 7–23)
CALCIUM SERPL-MCNC: 9.3 MG/DL — SIGNIFICANT CHANGE UP (ref 8.5–10.1)
CHLORIDE SERPL-SCNC: 102 MMOL/L — SIGNIFICANT CHANGE UP (ref 96–108)
CO2 SERPL-SCNC: 30 MMOL/L — SIGNIFICANT CHANGE UP (ref 22–31)
CREAT SERPL-MCNC: 0.74 MG/DL — SIGNIFICANT CHANGE UP (ref 0.5–1.3)
CULTURE RESULTS: SIGNIFICANT CHANGE UP
GLUCOSE SERPL-MCNC: 89 MG/DL — SIGNIFICANT CHANGE UP (ref 70–99)
HCT VFR BLD CALC: 38.9 % — SIGNIFICANT CHANGE UP (ref 34.5–45)
HGB BLD-MCNC: 12.5 G/DL — SIGNIFICANT CHANGE UP (ref 11.5–15.5)
MCHC RBC-ENTMCNC: 31.7 PG — SIGNIFICANT CHANGE UP (ref 27–34)
MCHC RBC-ENTMCNC: 32.1 GM/DL — SIGNIFICANT CHANGE UP (ref 32–36)
MCV RBC AUTO: 98.7 FL — SIGNIFICANT CHANGE UP (ref 80–100)
NRBC # BLD: 0 /100 WBCS — SIGNIFICANT CHANGE UP (ref 0–0)
PLATELET # BLD AUTO: 192 K/UL — SIGNIFICANT CHANGE UP (ref 150–400)
POTASSIUM SERPL-MCNC: 4 MMOL/L — SIGNIFICANT CHANGE UP (ref 3.5–5.3)
POTASSIUM SERPL-SCNC: 4 MMOL/L — SIGNIFICANT CHANGE UP (ref 3.5–5.3)
PROT SERPL-MCNC: 7.1 GM/DL — SIGNIFICANT CHANGE UP (ref 6–8.3)
RBC # BLD: 3.94 M/UL — SIGNIFICANT CHANGE UP (ref 3.8–5.2)
RBC # FLD: 12.7 % — SIGNIFICANT CHANGE UP (ref 10.3–14.5)
SODIUM SERPL-SCNC: 138 MMOL/L — SIGNIFICANT CHANGE UP (ref 135–145)
SPECIMEN SOURCE: SIGNIFICANT CHANGE UP
WBC # BLD: 7.87 K/UL — SIGNIFICANT CHANGE UP (ref 3.8–10.5)
WBC # FLD AUTO: 7.87 K/UL — SIGNIFICANT CHANGE UP (ref 3.8–10.5)

## 2019-05-14 PROCEDURE — 93306 TTE W/DOPPLER COMPLETE: CPT | Mod: 26

## 2019-05-14 RX ADMIN — ENOXAPARIN SODIUM 30 MILLIGRAM(S): 100 INJECTION SUBCUTANEOUS at 13:14

## 2019-05-14 RX ADMIN — ATORVASTATIN CALCIUM 10 MILLIGRAM(S): 80 TABLET, FILM COATED ORAL at 22:00

## 2019-05-14 RX ADMIN — Medication 81 MILLIGRAM(S): at 13:14

## 2019-05-14 NOTE — PROGRESS NOTE ADULT - SUBJECTIVE AND OBJECTIVE BOX
Subjective Complaints:  Historian:  record       REVIEW OF SYSTEMS:  Eyes:  Good vision, no reported pain  ENT:  No sore throat, pain, runny nose, dysphagia  CV:  No pain, palpitatioins, hypo/hypertension  Resp:  No dyspnea, cough, tachypnea, wheezing  GI:  No pain, nausea, vomiting, diarrhea, constipatiion  Muscle:  No pain, weakness  Neuro:  No weakness, tingling, memory problems  Psych:  No fatigue, insomnia, mood problems, depression  Endocrine:  No polyuria, polydypsia, cold/heat intolerance    Vital Signs Last 24 Hrs  T(C): 36.7 (14 May 2019 05:19), Max: 37 (13 May 2019 17:00)  T(F): 98.1 (14 May 2019 05:19), Max: 98.6 (13 May 2019 17:00)  HR: 70 (14 May 2019 05:19) (70 - 104)  BP: 156/77 (14 May 2019 05:19) (120/81 - 161/89)  BP(mean): --  RR: 18 (14 May 2019 05:19) (17 - 18)  SpO2: 92% (14 May 2019 05:19) (90% - 92%)    GENERAL PHYSICAL EXAM:  General:  Appears stated age, well-groomed, well-nourished, no distress  HEENT:  NC/AT, patent nares w/ pink mucosa, OP clear w/o lesions, PERRL, EOMI, conjunctivae clear, no thyromegaly, nodules, adenopathy, no JVD  Chest:  Full & symmetric excursion, no increased effort, breath sounds clear  Cardiovascular:  Regular rhythm, S1, S2, no murmur/rub/S3/S4, no carotid/femoral/abdominal bruit, radial/pedal pulses 2+, no edema  Abdomen:  Soft, non-tender, non-distended, normoactive bowel sounds, no HSM  Extremities:  Gait & station:   Digits:   Nails:   Joints, Bones, Muscles:   ROM:   Stability:  Skin:  No rash/erythema/ecchymoses/petechiae/wounds/abscess/warm/dry  Musculoskeletal:  Full ROM in all joints w/o swelling/tenderness/effusion    NEUROLOGICAL EXAM:  HENT:  Normocephalic head; atraumatic head.  Neck supple.  ENT: normal looking.  Mental State:    Alert.  Fully oriented to person, speech : unable to express herself and to comprehend spoken language     Cranial Nerves:  II-XII:   Pupils round and reactive to light and accommodation.  Extraocular movements full.  Facial symmetry intact.  Tongue midline.  Motor Functions:  Motor strength is 2/5   Sensory Functions:  unreliable   Reflexes:  Deep tendon reflexes normoactive to biceps, knees and ankles.plantar responses are flexor   Cerebellar Testing:   can not be tested   Gait :unable to stand       LABS:                        12.5   7.87  )-----------( 192      ( 14 May 2019 07:06 )             38.9     05-14    138  |  102  |  12  ----------------------------<  89  4.0   |  30  |  0.74    Ca    9.3      14 May 2019 07:06    TPro  7.1  /  Alb  3.2<L>  /  TBili  1.0  /  DBili  x   /  AST  27  /  ALT  24  /  AlkPhos  72  05-14            RADIOLOGY & ADDITIONAL STUDIES:  BRAIN MRI : NO ACUTE INFARCT OR HEMORRHAGE --CEREBRAL ATROPHY   Diet, Soft:   Supplement Feeding Modality:  Oral  Ensure Enlive Cans or Servings Per Day:  1       Frequency:  Two Times a day (05-13 @ 15:30)  DX TIA     Recommendations:   Carotid doppler.  Echocardiogram.  EEG.   DVT prophylaxis as ordered.  Medications:  ASA

## 2019-05-14 NOTE — PROGRESS NOTE ADULT - SUBJECTIVE AND OBJECTIVE BOX
INTERVAL HPI/OVERNIGHT EVENTS:        REVIEW OF SYSTEMS:  CONSTITUTIONAL:   alert  comfortable      NECK: No pain or stiffnes  RESPIRATORY: No SOB   CARDIOVASCULAR: No chest pain, palpitations, dizziness,   GASTROINTESTINAL: No abdominal pain. No nausea, vomiting,   NEUROLOGICAL: No headaches, no  blurry  vision no  dizziness  SKIN: No itching,   MUSCULOSKELETAL: No pain    MEDICATION:  aspirin enteric coated 81 milliGRAM(s) Oral daily  atorvastatin 10 milliGRAM(s) Oral at bedtime  enoxaparin Injectable 30 milliGRAM(s) SubCutaneous daily    Vital Signs Last 24 Hrs  T(C): 36.7 (14 May 2019 05:19), Max: 37 (13 May 2019 17:00)  T(F): 98.1 (14 May 2019 05:19), Max: 98.6 (13 May 2019 17:00)  HR: 70 (14 May 2019 05:19) (70 - 104)  BP: 156/77 (14 May 2019 05:19) (120/81 - 161/89)  BP(mean): --  RR: 18 (14 May 2019 05:19) (17 - 18)  SpO2: 92% (14 May 2019 05:19) (90% - 92%)    PHYSICAL EXAM:  GENERAL: NAD, well-groomed, well-developed  EYES:  conjunctiva and sclera clear  ENMT:  Moist mucous membranes,   NECK: Supple, No JVD, Normal thyroid  NERVOUS SYSTEM:  Alert oriented x1    moves  all  extr   CHEST/LUNG: Clear    HEART: Regular rate and rhythm; No murmurs, rubs, or gallops  ABDOMEN: Soft, Nontender, Nondistended; Bowel sounds present  EXTREMITIES:  no  edema no  tenderness  SKIN: No rashes   LABS:                        12.5   7.87  )-----------( 192      ( 14 May 2019 07:06 )             38.9     05-14    138  |  102  |  12  ----------------------------<  89  4.0   |  30  |  0.74    Ca    9.3      14 May 2019 07:06    TPro  7.1  /  Alb  3.2<L>  /  TBili  1.0  /  DBili  x   /  AST  27  /  ALT  24  /  AlkPhos  72  05-14        CAPILLARY BLOOD GLUCOSE          RADIOLOGY & ADDITIONAL TESTS:    Imaging reports  Personally Reviewed:  [ x] YES  [ ] NO    Consultant(s) Notes Reviewed:  [x ] YES  [ ] NO    Care Discussed with Consultants/Other Providers [x ] YES  [ ] NO  Problem/Plan - 1:  ·  Problem: CVA (cerebral vascular accident).  Plan: asa  statin for TTE  neuro  eval patient  eval. appreciated    Problem/Plan - 2:  ·  Problem: FH: senile dementia.  Plan: observation

## 2019-05-15 LAB
FLU A RESULT: SIGNIFICANT CHANGE UP
FLU A RESULT: SIGNIFICANT CHANGE UP
FLUAV AG NPH QL: SIGNIFICANT CHANGE UP
FLUBV AG NPH QL: SIGNIFICANT CHANGE UP
RSV RESULT: SIGNIFICANT CHANGE UP
RSV RNA RESP QL NAA+PROBE: SIGNIFICANT CHANGE UP

## 2019-05-15 PROCEDURE — 93010 ELECTROCARDIOGRAM REPORT: CPT

## 2019-05-15 RX ORDER — ATORVASTATIN CALCIUM 80 MG/1
1 TABLET, FILM COATED ORAL
Qty: 0 | Refills: 0 | DISCHARGE
Start: 2019-05-15

## 2019-05-15 RX ORDER — ACETAMINOPHEN 500 MG
650 TABLET ORAL EVERY 6 HOURS
Refills: 0 | Status: DISCONTINUED | OUTPATIENT
Start: 2019-05-15 | End: 2019-05-18

## 2019-05-15 RX ORDER — ASPIRIN/CALCIUM CARB/MAGNESIUM 324 MG
1 TABLET ORAL
Qty: 0 | Refills: 0 | DISCHARGE
Start: 2019-05-15

## 2019-05-15 RX ORDER — METOPROLOL TARTRATE 50 MG
25 TABLET ORAL
Refills: 0 | Status: DISCONTINUED | OUTPATIENT
Start: 2019-05-15 | End: 2019-05-18

## 2019-05-15 RX ADMIN — Medication 81 MILLIGRAM(S): at 13:46

## 2019-05-15 RX ADMIN — Medication 25 MILLIGRAM(S): at 21:45

## 2019-05-15 RX ADMIN — ATORVASTATIN CALCIUM 10 MILLIGRAM(S): 80 TABLET, FILM COATED ORAL at 21:45

## 2019-05-15 RX ADMIN — ENOXAPARIN SODIUM 30 MILLIGRAM(S): 100 INJECTION SUBCUTANEOUS at 13:46

## 2019-05-15 NOTE — SPEECH LANGUAGE PATHOLOGY EVALUATION - SLP GENERAL OBSERVATIONS
pt seen bedside alert and oriented to self. pt willing to participate in exam but noted limited attention span and tangential comments. administered subtests from the [facility] short form. pt perseverated on utterances "I love you", "my mother", or "I love her".

## 2019-05-15 NOTE — SPEECH LANGUAGE PATHOLOGY EVALUATION - COMMENTS
MRI head 5/13/2019 IMPRESSION: No acute intracranial hemorrhage or evidence of acute ischemia.  Multiple nonspecific abnormal white matter foci of T2/FLAIR prolongation statistically favoring microvascular disease.

## 2019-05-15 NOTE — SPEECH LANGUAGE PATHOLOGY EVALUATION - H & P REVIEW
yes/patient  reported  with  weakness  this  am  evalauted  in  ER  found  to  have  cva  admitted  for  further  w/u  and treatment  72yo female with pmh dementia (AOx2, ambulates mostly independently) presents with  left facial droop, complete aphasia and gen weakness noted upon waking up at 12pm but then improved upon arrival to ER at 1pm, now pt at baseline)

## 2019-05-15 NOTE — SPEECH LANGUAGE PATHOLOGY EVALUATION - SLP DIAGNOSIS
pt presented with deficits in receptive language marked by decreased object/picture ID, reduced word recognition of colors, letters, numbers, body parts and left right discrimination, decreased auditory comprehension of personal/factual yes no questions, reduced auditory processing of complex yes no questions, decreased comprehension of one/two step directions, reduced oral reading and reading comprehension for phrases, and expressive language deficits mitch by reduced automatics, decreased repetition at the word and phrase level, and reduced confrontational/responsive naming

## 2019-05-15 NOTE — PROGRESS NOTE ADULT - SUBJECTIVE AND OBJECTIVE BOX
INTERVAL HPI/OVERNIGHT EVENTS:        REVIEW OF SYSTEMS:  CONSTITUTIONAL: alert  comfortable no  complaints    NECK: No pain or stiffnes  RESPIRATORY: No SOB   CARDIOVASCULAR: No chest pain, palpitations, dizziness,   GASTROINTESTINAL: No abdominal pain. No nausea, vomiting,   NEUROLOGICAL: No headaches, no  blurry  vision no  dizziness  SKIN: No itching,   MUSCULOSKELETAL: No pain    MEDICATION:  aspirin enteric coated 81 milliGRAM(s) Oral daily  atorvastatin 10 milliGRAM(s) Oral at bedtime  enoxaparin Injectable 30 milliGRAM(s) SubCutaneous daily    Vital Signs Last 24 Hrs  T(C): 36.9 (15 May 2019 06:00), Max: 36.9 (15 May 2019 06:00)  T(F): 98.5 (15 May 2019 06:00), Max: 98.5 (15 May 2019 06:00)  HR: 100 (15 May 2019 06:00) (72 - 114)  BP: 116/71 (15 May 2019 06:00) (107/62 - 155/78)  BP(mean): --  RR: 18 (15 May 2019 06:00) (16 - 18)  SpO2: 100% (15 May 2019 06:00) (97% - 100%)    PHYSICAL EXAM:  GENERAL: NAD, well-groomed, well-developed  EYES:  conjunctiva and sclera clear  ENMT:  Moist mucous membranes,   NECK: Supple, No JVD, Normal thyroid  NERVOUS SYSTEM:  Alert oriented   LE motor wekness  CHEST/LUNG: Clear    HEART: Regular rate and rhythm; No murmurs, rubs, or gallops  ABDOMEN: Soft, Nontender, Nondistended; Bowel sounds present  EXTREMITIES:  no  edema no  tenderness  SKIN: No rashes   LABS:                        12.5   7.87  )-----------( 192      ( 14 May 2019 07:06 )             38.9     05-14    138  |  102  |  12  ----------------------------<  89  4.0   |  30  |  0.74    Ca    9.3      14 May 2019 07:06    TPro  7.1  /  Alb  3.2<L>  /  TBili  1.0  /  DBili  x   /  AST  27  /  ALT  24  /  AlkPhos  72  05-14        CAPILLARY BLOOD GLUCOSE          RADIOLOGY & ADDITIONAL TESTS:    Imaging reports  Personally Reviewed:  [x ] YES  [ ] NO    Consultant(s) Notes Reviewed:  [x ] YES  [ ] NO    Care Discussed with Consultants/Other Providers [ x] YES  [ ] NO  Problem/Plan - 1:  ·  Problem: TIA  unsteady  gait  Plan: asa  statin  JANUSZ    Problem/Plan - 2:  ·  Problem: FH: senile dementia  mild  cognitive  impairmant  as  per  neurology.

## 2019-05-15 NOTE — DISCHARGE NOTE PROVIDER - HOSPITAL COURSE
patient  monitored  on  telemetry  treated  with  ASA  LIPITOR  no  arrythmias  W/U  including  MRI  TTE  Carotid  doppler inconclusive     evaluted  by  neurology  physiatry  PT cliniucally  improved  discharged  to  rehab

## 2019-05-16 LAB
HCT VFR BLD CALC: 38.6 % — SIGNIFICANT CHANGE UP (ref 34.5–45)
HGB BLD-MCNC: 12 G/DL — SIGNIFICANT CHANGE UP (ref 11.5–15.5)
MCHC RBC-ENTMCNC: 31.1 GM/DL — LOW (ref 32–36)
MCHC RBC-ENTMCNC: 31.3 PG — SIGNIFICANT CHANGE UP (ref 27–34)
MCV RBC AUTO: 100.8 FL — HIGH (ref 80–100)
NRBC # BLD: 0 /100 WBCS — SIGNIFICANT CHANGE UP (ref 0–0)
PLATELET # BLD AUTO: 243 K/UL — SIGNIFICANT CHANGE UP (ref 150–400)
RBC # BLD: 3.83 M/UL — SIGNIFICANT CHANGE UP (ref 3.8–5.2)
RBC # FLD: 13 % — SIGNIFICANT CHANGE UP (ref 10.3–14.5)
WBC # BLD: 6.34 K/UL — SIGNIFICANT CHANGE UP (ref 3.8–10.5)
WBC # FLD AUTO: 6.34 K/UL — SIGNIFICANT CHANGE UP (ref 3.8–10.5)

## 2019-05-16 PROCEDURE — 93010 ELECTROCARDIOGRAM REPORT: CPT

## 2019-05-16 RX ADMIN — ENOXAPARIN SODIUM 30 MILLIGRAM(S): 100 INJECTION SUBCUTANEOUS at 11:55

## 2019-05-16 RX ADMIN — Medication 25 MILLIGRAM(S): at 17:19

## 2019-05-16 RX ADMIN — ATORVASTATIN CALCIUM 10 MILLIGRAM(S): 80 TABLET, FILM COATED ORAL at 21:40

## 2019-05-16 RX ADMIN — Medication 25 MILLIGRAM(S): at 05:31

## 2019-05-16 RX ADMIN — Medication 81 MILLIGRAM(S): at 11:55

## 2019-05-16 NOTE — PROGRESS NOTE ADULT - SUBJECTIVE AND OBJECTIVE BOX
INTERVAL HPI/OVERNIGHT EVENTS:    patient  reported  with  rapid  HR  140s  yesterday  evalauted  by  medical  PA  note  not  availeable  sinus  rythm  now 64/min on  monitor    REVIEW OF SYSTEMS:  CONSTITUTIONAL:  feels denies  CP  dizziness  ( poor  historian)    NECK: No pain or stiffnes  RESPIRATORY: No SOB   CARDIOVASCULAR: No chest pain, palpitations, dizziness,   GASTROINTESTINAL: No abdominal pain. No nausea, vomiting,   NEUROLOGICAL: No headaches, no  blurry  vision no  dizziness  SKIN: No itching,   MUSCULOSKELETAL: No pain    MEDICATION:  acetaminophen   Tablet .. 650 milliGRAM(s) Oral every 6 hours PRN  aspirin enteric coated 81 milliGRAM(s) Oral daily  atorvastatin 10 milliGRAM(s) Oral at bedtime  enoxaparin Injectable 30 milliGRAM(s) SubCutaneous daily  metoprolol tartrate 25 milliGRAM(s) Oral two times a day    Vital Signs Last 24 Hrs  T(C): 36.6 (16 May 2019 05:18), Max: 37.7 (15 May 2019 11:43)  T(F): 97.8 (16 May 2019 05:18), Max: 99.8 (15 May 2019 11:43)  HR: 78 (16 May 2019 05:18) (78 - 136)  BP: 132/70 (16 May 2019 05:18) (100/56 - 132/70)  BP(mean): --  RR: 18 (16 May 2019 05:18) (16 - 18)  SpO2: 100% (16 May 2019 05:18) (99% - 100%)    PHYSICAL EXAM:  GENERAL: NAD, well-groomed, well-developed  EYES:  conjunctiva and sclera clear  ENMT:  Moist mucous membranes,   NECK: Supple, No JVD, Normal thyroid  NERVOUS SYSTEM:  Alert oriented x2  no  focal  deficits;   CHEST/LUNG: Clear    HEART: Regular rate and rhythm; No murmurs, rubs, or gallops  ABDOMEN: Soft, Nontender, Nondistended; Bowel sounds present  EXTREMITIES:  no  edema no  tenderness  SKIN: No rashes   LABS:                        12.5   7.87  )-----------( 192      ( 14 May 2019 07:06 )             38.9     05-14    138  |  102  |  12  ----------------------------<  89  4.0   |  30  |  0.74    Ca    9.3      14 May 2019 07:06    TPro  7.1  /  Alb  3.2<L>  /  TBili  1.0  /  DBili  x   /  AST  27  /  ALT  24  /  AlkPhos  72  05-14        CAPILLARY BLOOD GLUCOSE          RADIOLOGY & ADDITIONAL TESTS:    Imaging reports  Personally Reviewed:  [x ] YES  [ ] NO    Consultant(s) Notes Reviewed:  [ x] YES  [ ] NO    Care Discussed with Consultants/Other Providers [x ] YES  [ ] NO  Care Discussed with Consultants/Other Providers [ x] YES  [ ] NO  Problem/Plan - 1:  ·  Problem: TIA  unsteady  gait  Plan: asa  statin  JANUSZ    Problem/Plan - 2:  ·  Problem: FH: senile dementia  mild  cognitive  impairmant  as  per  neurology.    Sinus  tachycardia  Vs  SVT  Repeat  EKG  cardiology  eval

## 2019-05-16 NOTE — PROGRESS NOTE ADULT - SUBJECTIVE AND OBJECTIVE BOX
Subjective Complaints:  Historian:         REVIEW OF SYSTEMS:  Eyes:  Good vision, no reported pain  ENT:  No sore throat, pain, runny nose, dysphagia  CV:  No pain, palpitatioins, hypo/hypertension  Resp:  No dyspnea, cough, tachypnea, wheezing  GI:  No pain, nausea, vomiting, diarrhea, constipatiion  Muscle:  No pain, weakness  Neuro:  No weakness, tingling, memory problems  Psych:  No fatigue, insomnia, mood problems, depression  Endocrine:  No polyuria, polydypsia, cold/heat intolerance    Vital Signs Last 24 Hrs  T(C): 36.6 (16 May 2019 05:18), Max: 37.7 (15 May 2019 11:43)  T(F): 97.8 (16 May 2019 05:18), Max: 99.8 (15 May 2019 11:43)  HR: 78 (16 May 2019 05:18) (78 - 136)  BP: 132/70 (16 May 2019 05:18) (100/56 - 132/70)  BP(mean): --  RR: 18 (16 May 2019 05:18) (16 - 18)  SpO2: 100% (16 May 2019 05:18) (99% - 100%)    GENERAL PHYSICAL EXAM:  General:  Appears stated age, well-groomed, well-nourished, no distress  HEENT:  NC/AT, patent nares w/ pink mucosa, OP clear w/o lesions, PERRL, EOMI, conjunctivae clear, no thyromegaly, nodules, adenopathy, no JVD  Chest:  Full & symmetric excursion, no increased effort, breath sounds clear  Cardiovascular:  Regular rhythm, S1, S2, no murmur/rub/S3/S4, no carotid/femoral/abdominal bruit, radial/pedal pulses 2+, no edema  Abdomen:  Soft, non-tender, non-distended, normoactive bowel sounds, no HSM  Extremities:  Gait & station:   Digits:   Nails:   Joints, Bones, Muscles:   ROM:   Stability:  Skin:  No rash/erythema/ecchymoses/petechiae/wounds/abscess/warm/dry  Musculoskeletal:  Full ROM in all joints w/o swelling/tenderness/effusion    NEUROLOGICAL EXAM:  HENT:  Normocephalic head; atraumatic head.  Neck supple.  ENT: normal looking.  Mental State:    Alert.  oriented to person, place  Coherent.  Speech clear and intact.  Cooperative.  Responds appropriately.   memory is impaired  Cranial Nerves:  II-XII:   Pupils round and reactive to light and accommodation.  Extraocular movements full.  Visual fields full (no homonymous hemianopsia).  Visual acuity wnl.  Facial symmetry intact.  Tongue midline.  Motor Functions:  Moves all extremities.  No pronator drift of UE. strength is 4/5 throughout    Sensory Functions: unreliable   Reflexes:  Deep tendon reflexes normoactive to biceps, knees and ankles. plantar responses are flexor   Cerebellar Testing:    Finger to nose intact.  Nystagmus absent.  Gait unremarkable      LABS:                  RADIOLOGY & ADDITIONAL STUDIES:    Remote Telemetry/EKG EXCEPT Off Unit Tests:     Time/Priority:  Routine (05-15 @ 11:29)  Temperature Rectal Only:     Frequency:  once (05-15 @ 11:53)  12 Lead ECG:   Provider's Contact #: [phone] (05-15 @ 11:53)  FLU A B RSV Detection by PCR: STAT (05-15 @ 12:17)  acetaminophen   Tablet ..: [Known as TYLENOL..]  650 milliGRAM(s), Oral, every 6 hours, PRN for Moderate Pain (4 - 6)  Administration Instructions: MAX DAILY DOSE:  ADULT = 4,000 mG/Day (05-15 @ 12:17)  Provider to RN:       hold discharge (05-15 @ 14:29)  metoprolol tartrate: [Known as LOPRESSOR]  25 milliGRAM(s), Oral, two times a day  Provider's Contact #: [phone] (05-15 @ 19:14)  Provider to RN:       please give lopressor STAT (05-15 @ 19:36)  12 Lead ECG:   Provider's Contact #: [phone] (05-16 @ 07:07)  Complete Blood Count: Routine (05-16 @ 07:07)  Comprehensive Metabolic Panel: AM Sched. Collection: 17-May-2019 05:00 (05-16 @ 07:07)  DX TIA 	    Recommendations:  Carotid doppler.  Echocardiogram.  DVT prophylaxis as ordered.  Medications:  ANTIPLATELET ,STATIN

## 2019-05-16 NOTE — CONSULT NOTE ADULT - SUBJECTIVE AND OBJECTIVE BOX
HPI:  HPI:  patient  reported  with  weakness  this  am  evalauted  in  ER  found  to  have  cva  admitted  for  further  w/u  and treatment (11 May 2019 19:25)      Chief Complaint:  Patient is a 73y old  Female who presents with a chief complaint of cva (16 May 2019 10:15)      Review of Systems:    General:  No wt loss, fevers, chills, night sweats  Eyes:  Good vision, no reported pain  ENT:  No sore throat, pain, runny nose, dysphagia  CV:  No pain, palpitations, hypo/hypertension  Resp:  No dyspnea, cough, tachypnea, wheezing  GI:  No pain, nausea, vomiting, diarrhea, constipation  G       Social History/Family History  SOCHX:   tobacco,  -  alcohol    FMHX: FA/MO  - contributory       Discussed with:  PMD, Family    Physical Exam:    Vital Signs:  Vital Signs Last 24 Hrs  T(C): 36.9 (16 May 2019 11:57), Max: 36.9 (16 May 2019 00:03)  T(F): 98.5 (16 May 2019 11:57), Max: 98.5 (16 May 2019 11:57)  HR: 83 (16 May 2019 15:15) (78 - 118)  BP: 123/63 (16 May 2019 15:15) (94/63 - 132/70)  BP(mean): --  RR: 16 (16 May 2019 15:15) (16 - 18)  SpO2: 100% (16 May 2019 15:15) (99% - 100%)  Daily     Daily Weight in k.5 (16 May 2019 05:18)  I&O's Summary    15 May 2019 07:  -  16 May 2019 07:00  --------------------------------------------------------  IN: 900 mL / OUT: 0 mL / NET: 900 mL    16 May 2019 07:01  -  16 May 2019 16:07  --------------------------------------------------------  IN: 600 mL / OUT: 0 mL / NET: 600 mL          Chest:  Full & symmetric excursion, no increased effort, breath sounds clear  Cardiovascular:  Regular rhythm, S1, S2, no murmur/rub/S3/S4, no carotid/femoral/abdominal bruit, radial/pedal pulses 2+, no edema  Abdomen:  Soft, non-tender, non-distended, normoactive bowel sounds, no HSM  Extremities:  Gait & station:   Digits:   Nails:   Joints, Bones, Muscles:   ROM:   Stability:    Laboratory:                          12.0   6.34  )-----------( 243      ( 16 May 2019 11:22 )             38.6                 Assessment:  SVT  CVA  TELE noted, NSR  TTE normal  DC plan from CV perspective  May arrange for 30 day event monitor as out patient  AC per neurology

## 2019-05-17 LAB
ALBUMIN SERPL ELPH-MCNC: 2.7 G/DL — LOW (ref 3.3–5)
ALP SERPL-CCNC: 69 U/L — SIGNIFICANT CHANGE UP (ref 40–120)
ALT FLD-CCNC: 32 U/L — SIGNIFICANT CHANGE UP (ref 12–78)
ANION GAP SERPL CALC-SCNC: 6 MMOL/L — SIGNIFICANT CHANGE UP (ref 5–17)
AST SERPL-CCNC: 28 U/L — SIGNIFICANT CHANGE UP (ref 15–37)
BILIRUB SERPL-MCNC: 0.6 MG/DL — SIGNIFICANT CHANGE UP (ref 0.2–1.2)
BUN SERPL-MCNC: 19 MG/DL — SIGNIFICANT CHANGE UP (ref 7–23)
CALCIUM SERPL-MCNC: 9.2 MG/DL — SIGNIFICANT CHANGE UP (ref 8.5–10.1)
CHLORIDE SERPL-SCNC: 104 MMOL/L — SIGNIFICANT CHANGE UP (ref 96–108)
CO2 SERPL-SCNC: 31 MMOL/L — SIGNIFICANT CHANGE UP (ref 22–31)
CREAT SERPL-MCNC: 0.7 MG/DL — SIGNIFICANT CHANGE UP (ref 0.5–1.3)
GLUCOSE SERPL-MCNC: 73 MG/DL — SIGNIFICANT CHANGE UP (ref 70–99)
POTASSIUM SERPL-MCNC: 4.2 MMOL/L — SIGNIFICANT CHANGE UP (ref 3.5–5.3)
POTASSIUM SERPL-SCNC: 4.2 MMOL/L — SIGNIFICANT CHANGE UP (ref 3.5–5.3)
PROT SERPL-MCNC: 6.4 GM/DL — SIGNIFICANT CHANGE UP (ref 6–8.3)
SODIUM SERPL-SCNC: 141 MMOL/L — SIGNIFICANT CHANGE UP (ref 135–145)

## 2019-05-17 RX ADMIN — ATORVASTATIN CALCIUM 10 MILLIGRAM(S): 80 TABLET, FILM COATED ORAL at 21:34

## 2019-05-17 RX ADMIN — ENOXAPARIN SODIUM 30 MILLIGRAM(S): 100 INJECTION SUBCUTANEOUS at 11:08

## 2019-05-17 RX ADMIN — Medication 81 MILLIGRAM(S): at 11:08

## 2019-05-17 RX ADMIN — Medication 25 MILLIGRAM(S): at 17:15

## 2019-05-17 RX ADMIN — Medication 25 MILLIGRAM(S): at 05:27

## 2019-05-17 NOTE — CHART NOTE - NSCHARTNOTEFT_GEN_A_CORE
Pt admitted c TIA;; PMHx CVA, cognitively impaired + senile dementia    Factors impacting intake: [ ] none [ ] nausea  [ ] vomiting [ ] diarrhea [ ] constipation  [ ]chewing problems [ ] swallowing issues  [ ] other:     Diet Prescription: Diet, Soft:   Supplement Feeding Modality:  Oral  Ensure Enlive Cans or Servings Per Day:  1       Frequency:  Two Times a day (05-13-19 @ 15:30)    Intake:  75 - 100% most meals; drinking supplement    Current Weight: Weight (kg): 42.8 (5/17); previous wt 38 (05-11 @ 22:03)  % Weight Change: 11% gain x 6 days    Physical Appearance:  no edema noted    Pertinent Medications: MEDICATIONS  (STANDING):  aspirin enteric coated 81 milliGRAM(s) Oral daily  atorvastatin 10 milliGRAM(s) Oral at bedtime  enoxaparin Injectable 30 milliGRAM(s) SubCutaneous daily  metoprolol tartrate 25 milliGRAM(s) Oral two times a day    MEDICATIONS  (PRN):  acetaminophen   Tablet .. 650 milliGRAM(s) Oral every 6 hours PRN Moderate Pain (4 - 6)    Pertinent Labs: 05-17 Na141 mmol/L Glu 73 mg/dL K+ 4.2 mmol/L Cr  0.70 mg/dL BUN 19 mg/dL 05-17 Alb 2.7 g/dL<L> 05-12 [id number] 5.0 % 05-12 Chol 338 mg/dL<H>  mg/dL<H> HDL 50 mg/dL Trig 71 mg/dL    Skin:   WDL    Estimated Needs:   [X ] no change since previous assessment (5/13)  [ ] recalculated:     Previous Nutrition Diagnosis:   [X ] Severe Malnutrition - chronic  Etiology:  Inadequate energy/protein intake related to Dementia & multiple CVA/TIA  Signs & Symptoms:  physical signs of severe fat depletion & muscle wasting as noted on 5/13 nutrition assessment     Nutrition Diagnosis is [ X] ongoing  [ ] resolved [ ] not applicable     GOAL:  Pt to consume > 75% meals/supplements - met; promote wt gain of 0.5 - 1#/week - met    New Nutrition Diagnosis: [X ] not applicable    Interventions:   continue current diet rx as noted  Recommend  [ ] Change Diet To:  [ ] Nutrition Supplement  [ ] Nutrition Support  [X ] Other:  cater to food preferences as appropriate    Monitoring and Evaluation:   [X ] PO intake [ x ] Tolerance to diet prescription [ x ] weights [ x ] labs[ x ] follow up per protocol  [ ] other:

## 2019-05-17 NOTE — PROGRESS NOTE ADULT - SUBJECTIVE AND OBJECTIVE BOX
INTERVAL HPI/OVERNIGHT EVENTS:    patient  not   transferred  to  rehab 5/16/19    REVIEW OF SYSTEMS:  CONSTITUTIONAL:  no  complaints    NECK: No pain or stiffnes  RESPIRATORY: No SOB   CARDIOVASCULAR: No chest pain, palpitations, dizziness,   GASTROINTESTINAL: No abdominal pain. No nausea, vomiting,   NEUROLOGICAL: No headaches, no  blurry  vision no  dizziness  SKIN: No itching,   MUSCULOSKELETAL: No pain    MEDICATION:  acetaminophen   Tablet .. 650 milliGRAM(s) Oral every 6 hours PRN  aspirin enteric coated 81 milliGRAM(s) Oral daily  atorvastatin 10 milliGRAM(s) Oral at bedtime  enoxaparin Injectable 30 milliGRAM(s) SubCutaneous daily  metoprolol tartrate 25 milliGRAM(s) Oral two times a day    Vital Signs Last 24 Hrs  T(C): 36.4 (16 May 2019 16:55), Max: 36.9 (16 May 2019 11:57)  T(F): 97.5 (16 May 2019 16:55), Max: 98.5 (16 May 2019 11:57)  HR: 66 (17 May 2019 04:21) (66 - 89)  BP: 152/83 (17 May 2019 04:21) (94/63 - 152/83)  BP(mean): --  RR: 17 (17 May 2019 04:21) (16 - 17)  SpO2: 99% (17 May 2019 04:21) (97% - 100%)    PHYSICAL EXAM:  GENERAL: NAD, well-groomed, well-developed  EYES:  conjunctiva and sclera clear  ENMT:  Moist mucous membranes,   NECK: Supple, No JVD, Normal thyroid  NERVOUS SYSTEM:  Alert oriented x2  LE  weakness   CHEST/LUNG: Clear    HEART: Regular rate and rhythm; No murmurs, rubs, or gallops  ABDOMEN: Soft, Nontender, Nondistended; Bowel sounds present  EXTREMITIES:  no  edema no  tenderness  SKIN: No rashes   LABS:                        12.0   6.34  )-----------( 243      ( 16 May 2019 11:22 )             38.6     05-17    141  |  104  |  19  ----------------------------<  73  4.2   |  31  |  0.70    Ca    9.2      17 May 2019 07:16    TPro  6.4  /  Alb  2.7<L>  /  TBili  0.6  /  DBili  x   /  AST  28  /  ALT  32  /  AlkPhos  69  05-17    EKG  SINUS  TACHYCARDIA  q  v1 v21    CAPILLARY BLOOD GLUCOSE          RADIOLOGY & ADDITIONAL TESTS:    Imaging reports  Personally Reviewed:  [x ] YES  [ ] NO    Consultant(s) Notes Reviewed:  [x ] YES  [ ] NO    Care Discussed with Consultants/Other Providers [x ] YES  [ ] NO  Problem/Plan - 1:  ·  Problem: TIA  unsteady  gait  Plan: asa  statin  JANUSZ    Problem/Plan - 2:  ·  Problem: FH: senile dementia  mild  cognitive  impairmant  as  per  neurology.    SINUS  TACHYCARDIA  RESOLVED  CARDIO F/U  APPRECIATED  DISCHARGE  TO  REHAB

## 2019-05-17 NOTE — PROGRESS NOTE ADULT - SUBJECTIVE AND OBJECTIVE BOX
73y old  Female who presents with a chief complaint of cva (16 May 2019 10:15)      Review of Systems:    General:  No wt loss, fevers, chills, night sweats  Eyes:  Good vision, no reported pain  ENT:  No sore throat, pain, runny nose, dysphagia  CV:  No pain, palpitations, hypo/hypertension  Resp:  No dyspnea, cough, tachypnea, wheezing  GI:  No pain, nausea, vomiting, diarrhea, constipation  G       Social History/Family History  SOCHX:   tobacco,  -  alcohol    FMHX: FA/MO  - contributory       Discussed with:  PMD, Family    Physical Exam:    Vital Signs:  Vital Signs Last 24 Hrs  T(C): 36.9 (16 May 2019 11:57), Max: 36.9 (16 May 2019 00:03)  T(F): 98.5 (16 May 2019 11:57), Max: 98.5 (16 May 2019 11:57)  HR: 83 (16 May 2019 15:15) (78 - 118)  BP: 123/63 (16 May 2019 15:15) (94/63 - 132/70)  BP(mean): --  RR: 16 (16 May 2019 15:15) (16 - 18)  SpO2: 100% (16 May 2019 15:15) (99% - 100%)  Daily     Daily Weight in k.5 (16 May 2019 05:18)  I&O's Summary    15 May 2019 07:01  -  16 May 2019 07:00  --------------------------------------------------------  IN: 900 mL / OUT: 0 mL / NET: 900 mL    16 May 2019 07:01  -  16 May 2019 16:07  --------------------------------------------------------  IN: 600 mL / OUT: 0 mL / NET: 600 mL          Chest:  Full & symmetric excursion, no increased effort, breath sounds clear  Cardiovascular:  Regular rhythm, S1, S2, no murmur/rub/S3/S4, no carotid/femoral/abdominal bruit, radial/pedal pulses 2+, no edema  Abdomen:  Soft, non-tender, non-distended, normoactive bowel sounds, no HSM  Extremities:  Gait & station:   Digits:   Nails:   Joints, Bones, Muscles:   ROM:   Stability:    Laboratory:                          12.0   6.34  )-----------( 243      ( 16 May 2019 11:22 )             38.6                 Assessment:  SVT  CVA  TELE noted, NSR  TTE normal  DC plan from CV perspective  May arrange for 30 day event monitor as out patient  AC per neurology

## 2019-05-18 VITALS
HEART RATE: 76 BPM | SYSTOLIC BLOOD PRESSURE: 117 MMHG | RESPIRATION RATE: 18 BRPM | OXYGEN SATURATION: 97 % | DIASTOLIC BLOOD PRESSURE: 68 MMHG | TEMPERATURE: 99 F

## 2019-05-18 RX ORDER — ACETAMINOPHEN 500 MG
2 TABLET ORAL
Qty: 0 | Refills: 0 | DISCHARGE
Start: 2019-05-18

## 2019-05-18 RX ORDER — METOPROLOL TARTRATE 50 MG
1 TABLET ORAL
Qty: 0 | Refills: 0 | DISCHARGE
Start: 2019-05-18

## 2019-05-18 RX ADMIN — ENOXAPARIN SODIUM 30 MILLIGRAM(S): 100 INJECTION SUBCUTANEOUS at 11:26

## 2019-05-18 RX ADMIN — Medication 25 MILLIGRAM(S): at 05:24

## 2019-05-18 RX ADMIN — Medication 81 MILLIGRAM(S): at 11:26

## 2019-05-18 NOTE — PROGRESS NOTE ADULT - SUBJECTIVE AND OBJECTIVE BOX
INTERVAL HPI/OVERNIGHT EVENTS:    patient  unable  to  have be  transferred  5/17/19 had  peer  to peer  discussion  with  Dr Coker  from 's  insurance  Co.  patient  accepted  for  rehab     REVIEW OF SYSTEMS:  CONSTITUTIONAL:  no  coplaints  MEDICATION:  acetaminophen   Tablet .. 650 milliGRAM(s) Oral every 6 hours PRN  aspirin enteric coated 81 milliGRAM(s) Oral daily  atorvastatin 10 milliGRAM(s) Oral at bedtime  enoxaparin Injectable 30 milliGRAM(s) SubCutaneous daily  metoprolol tartrate 25 milliGRAM(s) Oral two times a day    Vital Signs Last 24 Hrs  T(C): 37.3 (17 May 2019 23:24), Max: 37.3 (17 May 2019 23:24)  T(F): 99.2 (17 May 2019 23:24), Max: 99.2 (17 May 2019 23:24)  HR: 74 (18 May 2019 04:27) (73 - 90)  BP: 124/75 (18 May 2019 04:27) (118/77 - 124/75)  BP(mean): --  RR: 18 (18 May 2019 04:27) (18 - 22)  SpO2: 100% (18 May 2019 04:27) (85% - 100%)    PHYSICAL EXAM:  GENERAL: NAD, well-groomed, well-developed  EYES:  conjunctiva and sclera clear  ENMT:  Moist mucous membranes,   NECK: Supple, No JVD, Normal thyroid  NERVOUS SYSTEM:  Alert oriented  x2  weakness  LEs    CHEST/LUNG: Clear    HEART: Regular rate and rhythm; No murmurs, rubs, or gallops  ABDOMEN: Soft, Nontender, Nondistended; Bowel sounds present  EXTREMITIES:  no  edema no  tenderness  SKIN: No rashes   LABS:                        12.0   6.34  )-----------( 243      ( 16 May 2019 11:22 )             38.6     05-17    141  |  104  |  19  ----------------------------<  73  4.2   |  31  |  0.70    Ca    9.2      17 May 2019 07:16    TPro  6.4  /  Alb  2.7<L>  /  TBili  0.6  /  DBili  x   /  AST  28  /  ALT  32  /  AlkPhos  69  05-17        CAPILLARY BLOOD GLUCOSE          RADIOLOGY & ADDITIONAL TESTS:    Imaging reports  Personally Reviewed:  [ ] YES  [ ] NO    Consultant(s) Notes Reviewed:  [x ] YES  [ ] NO    Care Discussed with Consultants/Other Providers [ x] YES  [ ] NO  Problem/Plan - 1:  ·  Problem: TIA  unsteady  gait  Plan: asa  statin  JANUSZ    Problem/Plan - 2:  ·  Problem: FH: senile dementia  mild  cognitive  impairmant  as  per  neurology.    SINUS  TACHYCARDIA  RESOLVED  CARDIO F/U  APPRECIATED  DISCHARGE  TO  REHAB

## 2019-05-23 DIAGNOSIS — G81.94 HEMIPLEGIA, UNSPECIFIED AFFECTING LEFT NONDOMINANT SIDE: ICD-10-CM

## 2019-05-23 DIAGNOSIS — E43 UNSPECIFIED SEVERE PROTEIN-CALORIE MALNUTRITION: ICD-10-CM

## 2019-05-23 DIAGNOSIS — R53.1 WEAKNESS: ICD-10-CM

## 2019-05-23 DIAGNOSIS — G45.9 TRANSIENT CEREBRAL ISCHEMIC ATTACK, UNSPECIFIED: ICD-10-CM

## 2019-05-23 DIAGNOSIS — R29.810 FACIAL WEAKNESS: ICD-10-CM

## 2019-05-23 DIAGNOSIS — I47.1 SUPRAVENTRICULAR TACHYCARDIA: ICD-10-CM

## 2019-05-23 DIAGNOSIS — F03.90 UNSPECIFIED DEMENTIA WITHOUT BEHAVIORAL DISTURBANCE: ICD-10-CM

## 2019-05-23 DIAGNOSIS — R47.01 APHASIA: ICD-10-CM

## 2019-05-23 DIAGNOSIS — E78.00 PURE HYPERCHOLESTEROLEMIA, UNSPECIFIED: ICD-10-CM

## 2020-02-27 ENCOUNTER — EMERGENCY (EMERGENCY)
Facility: HOSPITAL | Age: 75
LOS: 0 days | Discharge: HOME | End: 2020-02-27
Attending: EMERGENCY MEDICINE
Payer: MEDICARE

## 2020-02-27 VITALS
RESPIRATION RATE: 18 BRPM | HEART RATE: 80 BPM | SYSTOLIC BLOOD PRESSURE: 178 MMHG | TEMPERATURE: 97 F | HEIGHT: 65 IN | OXYGEN SATURATION: 95 % | WEIGHT: 100.09 LBS | DIASTOLIC BLOOD PRESSURE: 98 MMHG

## 2020-02-27 VITALS
DIASTOLIC BLOOD PRESSURE: 81 MMHG | HEART RATE: 67 BPM | OXYGEN SATURATION: 99 % | SYSTOLIC BLOOD PRESSURE: 165 MMHG | TEMPERATURE: 98 F | RESPIRATION RATE: 18 BRPM

## 2020-02-27 DIAGNOSIS — Z79.82 LONG TERM (CURRENT) USE OF ASPIRIN: ICD-10-CM

## 2020-02-27 DIAGNOSIS — N39.0 URINARY TRACT INFECTION, SITE NOT SPECIFIED: ICD-10-CM

## 2020-02-27 DIAGNOSIS — R55 SYNCOPE AND COLLAPSE: ICD-10-CM

## 2020-02-27 DIAGNOSIS — R41.82 ALTERED MENTAL STATUS, UNSPECIFIED: ICD-10-CM

## 2020-02-27 PROBLEM — Z82.0 FAMILY HISTORY OF EPILEPSY AND OTHER DISEASES OF THE NERVOUS SYSTEM: Chronic | Status: ACTIVE | Noted: 2019-05-11

## 2020-02-27 LAB
ALBUMIN SERPL ELPH-MCNC: 3.4 G/DL — SIGNIFICANT CHANGE UP (ref 3.3–5)
ALP SERPL-CCNC: 91 U/L — SIGNIFICANT CHANGE UP (ref 40–120)
ALT FLD-CCNC: 17 U/L — SIGNIFICANT CHANGE UP (ref 12–78)
ANION GAP SERPL CALC-SCNC: 12 MMOL/L — SIGNIFICANT CHANGE UP (ref 5–17)
APPEARANCE UR: ABNORMAL
APTT BLD: 32.1 SEC — SIGNIFICANT CHANGE UP (ref 27.5–36.3)
AST SERPL-CCNC: 15 U/L — SIGNIFICANT CHANGE UP (ref 15–37)
BASOPHILS # BLD AUTO: 0.03 K/UL — SIGNIFICANT CHANGE UP (ref 0–0.2)
BASOPHILS NFR BLD AUTO: 0.5 % — SIGNIFICANT CHANGE UP (ref 0–2)
BILIRUB SERPL-MCNC: 1 MG/DL — SIGNIFICANT CHANGE UP (ref 0.2–1.2)
BILIRUB UR-MCNC: NEGATIVE — SIGNIFICANT CHANGE UP
BUN SERPL-MCNC: 25 MG/DL — HIGH (ref 7–23)
CALCIUM SERPL-MCNC: 8.8 MG/DL — SIGNIFICANT CHANGE UP (ref 8.5–10.1)
CHLORIDE SERPL-SCNC: 103 MMOL/L — SIGNIFICANT CHANGE UP (ref 96–108)
CK SERPL-CCNC: 66 U/L — SIGNIFICANT CHANGE UP (ref 26–192)
CO2 SERPL-SCNC: 28 MMOL/L — SIGNIFICANT CHANGE UP (ref 22–31)
COLOR SPEC: YELLOW — SIGNIFICANT CHANGE UP
CREAT SERPL-MCNC: 1.19 MG/DL — SIGNIFICANT CHANGE UP (ref 0.5–1.3)
DIFF PNL FLD: ABNORMAL
EOSINOPHIL # BLD AUTO: 0.01 K/UL — SIGNIFICANT CHANGE UP (ref 0–0.5)
EOSINOPHIL NFR BLD AUTO: 0.2 % — SIGNIFICANT CHANGE UP (ref 0–6)
GLUCOSE BLDC GLUCOMTR-MCNC: 105 MG/DL — HIGH (ref 70–99)
GLUCOSE SERPL-MCNC: 287 MG/DL — HIGH (ref 70–99)
GLUCOSE UR QL: NEGATIVE MG/DL — SIGNIFICANT CHANGE UP
HCT VFR BLD CALC: 43.5 % — SIGNIFICANT CHANGE UP (ref 34.5–45)
HGB BLD-MCNC: 13.8 G/DL — SIGNIFICANT CHANGE UP (ref 11.5–15.5)
IMM GRANULOCYTES NFR BLD AUTO: 0.5 % — SIGNIFICANT CHANGE UP (ref 0–1.5)
INR BLD: 1.01 RATIO — SIGNIFICANT CHANGE UP (ref 0.88–1.16)
KETONES UR-MCNC: NEGATIVE — SIGNIFICANT CHANGE UP
LACTATE SERPL-SCNC: 1.9 MMOL/L — SIGNIFICANT CHANGE UP (ref 0.7–2)
LACTATE SERPL-SCNC: 2.3 MMOL/L — HIGH (ref 0.7–2)
LEUKOCYTE ESTERASE UR-ACNC: ABNORMAL
LYMPHOCYTES # BLD AUTO: 1.09 K/UL — SIGNIFICANT CHANGE UP (ref 1–3.3)
LYMPHOCYTES # BLD AUTO: 18.6 % — SIGNIFICANT CHANGE UP (ref 13–44)
MCHC RBC-ENTMCNC: 31.4 PG — SIGNIFICANT CHANGE UP (ref 27–34)
MCHC RBC-ENTMCNC: 31.7 GM/DL — LOW (ref 32–36)
MCV RBC AUTO: 99.1 FL — SIGNIFICANT CHANGE UP (ref 80–100)
MONOCYTES # BLD AUTO: 0.28 K/UL — SIGNIFICANT CHANGE UP (ref 0–0.9)
MONOCYTES NFR BLD AUTO: 4.8 % — SIGNIFICANT CHANGE UP (ref 2–14)
NEUTROPHILS # BLD AUTO: 4.42 K/UL — SIGNIFICANT CHANGE UP (ref 1.8–7.4)
NEUTROPHILS NFR BLD AUTO: 75.4 % — SIGNIFICANT CHANGE UP (ref 43–77)
NITRITE UR-MCNC: NEGATIVE — SIGNIFICANT CHANGE UP
NRBC # BLD: 0 /100 WBCS — SIGNIFICANT CHANGE UP (ref 0–0)
PH UR: 6.5 — SIGNIFICANT CHANGE UP (ref 5–8)
PLATELET # BLD AUTO: 218 K/UL — SIGNIFICANT CHANGE UP (ref 150–400)
POTASSIUM SERPL-MCNC: 3.4 MMOL/L — LOW (ref 3.5–5.3)
POTASSIUM SERPL-SCNC: 3.4 MMOL/L — LOW (ref 3.5–5.3)
PROT SERPL-MCNC: 7.6 GM/DL — SIGNIFICANT CHANGE UP (ref 6–8.3)
PROT UR-MCNC: NEGATIVE MG/DL — SIGNIFICANT CHANGE UP
PROTHROM AB SERPL-ACNC: 11.3 SEC — SIGNIFICANT CHANGE UP (ref 10–12.9)
RBC # BLD: 4.39 M/UL — SIGNIFICANT CHANGE UP (ref 3.8–5.2)
RBC # FLD: 13.2 % — SIGNIFICANT CHANGE UP (ref 10.3–14.5)
SODIUM SERPL-SCNC: 143 MMOL/L — SIGNIFICANT CHANGE UP (ref 135–145)
SP GR SPEC: 1.01 — SIGNIFICANT CHANGE UP (ref 1.01–1.02)
TROPONIN I SERPL-MCNC: <.015 NG/ML — SIGNIFICANT CHANGE UP (ref 0.01–0.04)
UROBILINOGEN FLD QL: NEGATIVE MG/DL — SIGNIFICANT CHANGE UP
WBC # BLD: 5.86 K/UL — SIGNIFICANT CHANGE UP (ref 3.8–10.5)
WBC # FLD AUTO: 5.86 K/UL — SIGNIFICANT CHANGE UP (ref 3.8–10.5)

## 2020-02-27 PROCEDURE — 71045 X-RAY EXAM CHEST 1 VIEW: CPT | Mod: 26

## 2020-02-27 PROCEDURE — 99285 EMERGENCY DEPT VISIT HI MDM: CPT

## 2020-02-27 PROCEDURE — 93010 ELECTROCARDIOGRAM REPORT: CPT

## 2020-02-27 RX ORDER — PSEUDOEPHEDRINE HCL 30 MG
1 TABLET ORAL
Qty: 20 | Refills: 0
Start: 2020-02-27 | End: 2020-03-02

## 2020-02-27 RX ORDER — METOPROLOL TARTRATE 50 MG
5 TABLET ORAL ONCE
Refills: 0 | Status: COMPLETED | OUTPATIENT
Start: 2020-02-27 | End: 2020-02-27

## 2020-02-27 RX ORDER — CEPHALEXIN 500 MG
1 CAPSULE ORAL
Qty: 20 | Refills: 0
Start: 2020-02-27 | End: 2020-03-07

## 2020-02-27 RX ORDER — CEPHALEXIN 500 MG
500 CAPSULE ORAL ONCE
Refills: 0 | Status: COMPLETED | OUTPATIENT
Start: 2020-02-27 | End: 2020-02-27

## 2020-02-27 RX ORDER — SODIUM CHLORIDE 9 MG/ML
1000 INJECTION INTRAMUSCULAR; INTRAVENOUS; SUBCUTANEOUS ONCE
Refills: 0 | Status: COMPLETED | OUTPATIENT
Start: 2020-02-27 | End: 2020-02-27

## 2020-02-27 RX ORDER — ALBUTEROL 90 UG/1
2 AEROSOL, METERED ORAL
Qty: 19 | Refills: 0
Start: 2020-02-27 | End: 2020-03-27

## 2020-02-27 RX ORDER — SODIUM CHLORIDE 9 MG/ML
1400 INJECTION INTRAMUSCULAR; INTRAVENOUS; SUBCUTANEOUS ONCE
Refills: 0 | Status: COMPLETED | OUTPATIENT
Start: 2020-02-27 | End: 2020-02-27

## 2020-02-27 RX ADMIN — SODIUM CHLORIDE 1400 MILLILITER(S): 9 INJECTION INTRAMUSCULAR; INTRAVENOUS; SUBCUTANEOUS at 13:02

## 2020-02-27 RX ADMIN — Medication 5 MILLIGRAM(S): at 15:34

## 2020-02-27 RX ADMIN — SODIUM CHLORIDE 1000 MILLILITER(S): 9 INJECTION INTRAMUSCULAR; INTRAVENOUS; SUBCUTANEOUS at 15:22

## 2020-02-27 RX ADMIN — Medication 500 MILLIGRAM(S): at 16:26

## 2020-02-27 RX ADMIN — SODIUM CHLORIDE 1400 MILLILITER(S): 9 INJECTION INTRAMUSCULAR; INTRAVENOUS; SUBCUTANEOUS at 15:05

## 2020-02-27 NOTE — ED PROVIDER NOTE - PATIENT PORTAL LINK FT
You can access the FollowMyHealth Patient Portal offered by [facility] by registering at the following website: [email or web address] By joining [email or web address]’s FollowMyHealth portal, you will also be able to view your health information using other applications (apps) compatible with our system.

## 2020-02-27 NOTE — ED PROVIDER NOTE - NSFOLLOWUPINSTRUCTIONS_ED_ALL_ED_FT
You were seen in the ER for UTI.  Take keflex twice a day.  Return to ER for life threatening emergencies.  Follow up with your doctor.

## 2020-02-27 NOTE — ED ADULT TRIAGE NOTE - CHIEF COMPLAINT QUOTE
1100 unresponsive at home  FS 68 given 1 amp D 50 Pt alert  family in triage pt at baseline onset hx dementia and stroke FS  Stroke eval done by Dr Hedrick and pt cleared

## 2020-02-27 NOTE — ED PROVIDER NOTE - OBJECTIVE STATEMENT
73yo F with hx of dementia (AOx1, intermittently follows commands) presents s/p unreponsive episode >10 minutes. Took a shower this AM, was put back into her bed. Son went to go get her food and when he returned, found her to be unresponsive. Called EMS, was "out" for >10 minutes. FS was in 65, was given D50 and her mental status improved. No hx of DM, not on any medications.

## 2020-02-27 NOTE — ED PROVIDER NOTE - CARE PLAN
Principal Discharge DX:	UTI (urinary tract infection) Principal Discharge DX:	UTI (urinary tract infection)  Secondary Diagnosis:	Vasovagal syncope

## 2020-02-27 NOTE — ED PROVIDER NOTE - PHYSICAL EXAMINATION
Gen: Cachectic female in NAD, AOx1, intermittently following commands  Head: NCAT  HEENT: PERRL, MMM, normal conjunctiva, anicteric, neck supple  Lung: CTAB, no adventitious sounds  CV: RRR, no murmurs, rubs or gallops  Abd: soft, NTND, no rebound or guarding, no CVAT  MSK: No edema, no visible deformities  Neuro: No focal neurologic deficits. CN II-XII grossly intact. 5/5 strength and normal sensation in all extremities.  Skin: Warm and dry, no evidence of rash  Psych: normal mood and affect

## 2020-02-27 NOTE — ED ADULT NURSE NOTE - OBJECTIVE STATEMENT
1100 unresponsive at home  FS 68 given 1 amp D 50 Pt alert  family in triage pt at baseline onset hx dementia and stroke FS .

## 2020-02-27 NOTE — ED PROVIDER NOTE - PROGRESS NOTE DETAILS
Will r/o source of infection. Concerns for FTT. No hx of DM, however presents with hypoglycemia. Pt at baseline per family members. Dx with UTi, able to tolerate PO. Will DC with PO keflex.

## 2020-02-27 NOTE — ED PROVIDER NOTE - ATTENDING CONTRIBUTION TO CARE
74 year old female PMHx dementia c/o syncope x few minutes, currently back to baseline. PE: NAD, CV RRR, lungs clear. I&P: UTI, syncope likely vasovagal or dementia related, abx, PMD or clinic follow up recommended for reassessment. Patient is aware of signs/symptoms to return to the emergency department.

## 2020-02-27 NOTE — ED PROVIDER NOTE - CLINICAL SUMMARY MEDICAL DECISION MAKING FREE TEXT BOX
Concerns for FTT. No hx of DM, however presents with hypoglycemia. Will check labs, EKG. r/o cardiac event. Will r/o source of infection. Will likely admit for FTT, hydration.

## 2020-03-03 LAB
CULTURE RESULTS: SIGNIFICANT CHANGE UP
CULTURE RESULTS: SIGNIFICANT CHANGE UP
SPECIMEN SOURCE: SIGNIFICANT CHANGE UP
SPECIMEN SOURCE: SIGNIFICANT CHANGE UP

## 2022-03-25 NOTE — SPEECH LANGUAGE PATHOLOGY EVALUATION - 1-STEP
Caller: YOLANDA VALERIE    Relationship: PATIENT    Best call back number: [phone]    Requested Prescriptions: OXYCODONE- ACETAMINOPHEN:  M TAB 5 TIMES A DAY  Requested Prescriptions      No prescriptions requested or ordered in this encounter        Pharmacy where request should be sent: [facility]: [id number]: [phone]      Additional details provided by patient:     Does the patient have less than a 3 day supply:  [x] Yes  [] No    Marlen Schreiber   22 10:28 EDT            1/3/no

## 2023-02-03 NOTE — DISCHARGE NOTE PROVIDER - NSDCCPCAREPLAN_GEN_ALL_CORE_FT
PROVIDER:[TOKEN:[[id number]],FOLLOWUP:[2 weeks]] PRINCIPAL DISCHARGE DIAGNOSIS  Diagnosis: TIA (transient ischemic attack)  Assessment and Plan of Treatment:       SECONDARY DISCHARGE DIAGNOSES  Diagnosis: Hypercholesteremia  Assessment and Plan of Treatment: PROVIDER:[TOKEN:[[id number]],FOLLOWUP:[2 weeks]],PROVIDER:[TOKEN:[[id number]]]

## 2023-02-28 NOTE — PROGRESS NOTE ADULT - SUBJECTIVE AND OBJECTIVE BOX
Patient is a 73y old  Female who presents with a chief complaint of cva (13 May 2019 08:50)    HPI: patient  reported  with  weakness  this  am  evalauted  in  ER  found  to  have  cva  admitted  for  further  w/u  and treatment (11 May 2019 19:25)    Currently in bed - confused, no complaints      REVIEW OF SYSTEMS as above - unable to answer. Discussed with son by phone - dementia, will not be able to answer    PAST MEDICAL & SURGICAL HISTORY  FH: senile dementia    SOCHX:  lives with her son and daughter-in-law in a pvt house - 5 steps to enter. She required assist for all ADL's. Ambulation ind  No tobacco, no alcohol    FMHX: FA/MO  Noncontributory     ALLERGIES  No Known Allergies    MEDICATIONS reviewed      Wt(kg): -- 38 kg    PHYSICAL EXAM   BMI - 13.9  Constitutional - NAD, apperas comfortable in bed, O2 NC  HEENT - NCAT, EOMI  Cardiovascular - RRR  Extremities - Functional range of motion? Limited exam due to inability to cooperate.  Neurologic -                    Cognitive - Awake, Alert, NAD     Speech grossly intact     Language  grossly intact     Motor - No focal deficits appreciated     Sensory - unreliable     Reflexes - DTR absent LEs     Psychiatric - Mood flat      RECENT LABS reviewed    IMAGING reviewed:  CT head - atrophic changes, no acute infarct or bleed  CXR - clear      Dr. Hoang - ?TIA - ASA, Statin    FUNCTIONAL HISTORY Ind amb all over the house without device.  Needed assistance with ADLs    CURRENT FUNCTIONAL STATUS Amb 5 feet with assistance    IMPRESSION: 73 F with dementia but was able to amb ind. until acute onset weakness, unable to ambulate. W/U negative so far but she was acute decline in function.    PLAN: She was a functional ambulator in her home prior to this admission, now limited to 5 feet with therapist.  She would benefit from a trial of rehab at a sub-acute level when medically cleared in order to safely return home. Punch Size In Mm: 3 135

## 2024-03-06 NOTE — PATIENT PROFILE ADULT - FUNCTIONAL SCREEN CURRENT LEVEL: EATING, MLM
[facility] Progress Note    Date: 2024    Name: Rina Prajapati    MRN: [id number]         : 1959    Weekly PICC care and prechemo labs  Ms Prajapati arrived to [facility], ambulatory at 0910. Pt was assessed and education was provided. Pt states she is doing well.    Ms. Prajapati's vitals were reviewed.    Patient Vitals for the past 12 hrs:   Temp Pulse Resp BP SpO2   24 0910 97.3 °F (36.3 °C) (!) 109 18 (!) 153/86 95 %     Left arm double lumen picc line with drsg clean, dry and intact. Flushes easily but NO blood return noted from either lumen. Microclaves and end caps changed. PICC dressing and stat lock changed per protocol. No redness, streaking, warmth, swelling or drainage noted at site. Lumens wrapped with gauze and coban. Both lumens flushed with 10 ml NS and 250 units heparin each. Pt's stockinette re-applied.    Ms. Prajapati tolerated procedure, and had no complaints.    Blood for labs (CBC, CMP) drawn via butterfly needle to right wrist x 3 attempts. Site dressed with gauze and coban. Pt tolerated well.    Patient armband removed and shredded.    [facility] at 0950. She is to return on 3/7/24 at 0900 for her next appointment for Docetaxel.    Nadira Perdomo RN  2024  11:39 AM            2 = assistive person
